# Patient Record
Sex: FEMALE | Race: WHITE | Employment: FULL TIME | URBAN - METROPOLITAN AREA
[De-identification: names, ages, dates, MRNs, and addresses within clinical notes are randomized per-mention and may not be internally consistent; named-entity substitution may affect disease eponyms.]

---

## 2017-03-20 ENCOUNTER — HOSPITAL LABORATORY (OUTPATIENT)
Dept: OTHER | Facility: CLINIC | Age: 72
End: 2017-03-20

## 2017-03-20 LAB — HGB BLD-MCNC: 13.1 G/DL (ref 11.7–15.7)

## 2017-03-21 ENCOUNTER — TRANSFERRED RECORDS (OUTPATIENT)
Dept: HEALTH INFORMATION MANAGEMENT | Facility: CLINIC | Age: 72
End: 2017-03-21

## 2017-04-03 ENCOUNTER — APPOINTMENT (OUTPATIENT)
Dept: PHYSICAL THERAPY | Facility: CLINIC | Age: 72
DRG: 470 | End: 2017-04-03
Attending: ORTHOPAEDIC SURGERY
Payer: COMMERCIAL

## 2017-04-03 ENCOUNTER — APPOINTMENT (OUTPATIENT)
Dept: GENERAL RADIOLOGY | Facility: CLINIC | Age: 72
DRG: 470 | End: 2017-04-03
Attending: ORTHOPAEDIC SURGERY
Payer: COMMERCIAL

## 2017-04-03 ENCOUNTER — ANESTHESIA (OUTPATIENT)
Dept: SURGERY | Facility: CLINIC | Age: 72
DRG: 470 | End: 2017-04-03
Payer: COMMERCIAL

## 2017-04-03 ENCOUNTER — ANESTHESIA EVENT (OUTPATIENT)
Dept: SURGERY | Facility: CLINIC | Age: 72
DRG: 470 | End: 2017-04-03
Payer: COMMERCIAL

## 2017-04-03 ENCOUNTER — HOSPITAL ENCOUNTER (INPATIENT)
Facility: CLINIC | Age: 72
LOS: 3 days | Discharge: HOME | DRG: 470 | End: 2017-04-06
Attending: ORTHOPAEDIC SURGERY | Admitting: ORTHOPAEDIC SURGERY
Payer: COMMERCIAL

## 2017-04-03 DIAGNOSIS — Z96.652 STATUS POST TOTAL LEFT KNEE REPLACEMENT: Primary | ICD-10-CM

## 2017-04-03 PROBLEM — Z96.659 S/P TOTAL KNEE ARTHROPLASTY: Status: ACTIVE | Noted: 2017-04-03

## 2017-04-03 LAB
ANION GAP SERPL CALCULATED.3IONS-SCNC: 9 MMOL/L (ref 3–14)
BUN SERPL-MCNC: 16 MG/DL (ref 7–30)
CALCIUM SERPL-MCNC: 8.8 MG/DL (ref 8.5–10.1)
CHLORIDE SERPL-SCNC: 102 MMOL/L (ref 94–109)
CO2 SERPL-SCNC: 29 MMOL/L (ref 20–32)
CREAT SERPL-MCNC: 0.76 MG/DL (ref 0.52–1.04)
GFR SERPL CREATININE-BSD FRML MDRD: 75 ML/MIN/1.7M2
GLUCOSE SERPL-MCNC: 107 MG/DL (ref 70–99)
HGB BLD-MCNC: 12.5 G/DL (ref 11.7–15.7)
PLATELET # BLD AUTO: 242 10E9/L (ref 150–450)
POTASSIUM SERPL-SCNC: 3.3 MMOL/L (ref 3.4–5.3)
SODIUM SERPL-SCNC: 140 MMOL/L (ref 133–144)

## 2017-04-03 PROCEDURE — 71000012 ZZH RECOVERY PHASE 1 LEVEL 1 FIRST HR: Performed by: ORTHOPAEDIC SURGERY

## 2017-04-03 PROCEDURE — 25000128 H RX IP 250 OP 636: Performed by: ORTHOPAEDIC SURGERY

## 2017-04-03 PROCEDURE — 25800025 ZZH RX 258: Performed by: ANESTHESIOLOGY

## 2017-04-03 PROCEDURE — 40000193 ZZH STATISTIC PT WARD VISIT: Performed by: PHYSICAL THERAPIST

## 2017-04-03 PROCEDURE — 25000132 ZZH RX MED GY IP 250 OP 250 PS 637: Performed by: ORTHOPAEDIC SURGERY

## 2017-04-03 PROCEDURE — 80048 BASIC METABOLIC PNL TOTAL CA: CPT | Performed by: ORTHOPAEDIC SURGERY

## 2017-04-03 PROCEDURE — 25000125 ZZHC RX 250: Performed by: ORTHOPAEDIC SURGERY

## 2017-04-03 PROCEDURE — 99207 ZZC CONSULT E&M CHANGED TO INITIAL LEVEL: CPT | Performed by: PHYSICIAN ASSISTANT

## 2017-04-03 PROCEDURE — 97530 THERAPEUTIC ACTIVITIES: CPT | Mod: GP | Performed by: PHYSICAL THERAPIST

## 2017-04-03 PROCEDURE — 0SRD0J9 REPLACEMENT OF LEFT KNEE JOINT WITH SYNTHETIC SUBSTITUTE, CEMENTED, OPEN APPROACH: ICD-10-PCS | Performed by: ORTHOPAEDIC SURGERY

## 2017-04-03 PROCEDURE — 97161 PT EVAL LOW COMPLEX 20 MIN: CPT | Mod: GP | Performed by: PHYSICAL THERAPIST

## 2017-04-03 PROCEDURE — 85018 HEMOGLOBIN: CPT | Performed by: ORTHOPAEDIC SURGERY

## 2017-04-03 PROCEDURE — C1776 JOINT DEVICE (IMPLANTABLE): HCPCS | Performed by: ORTHOPAEDIC SURGERY

## 2017-04-03 PROCEDURE — 27110028 ZZH OR GENERAL SUPPLY NON-STERILE: Performed by: ORTHOPAEDIC SURGERY

## 2017-04-03 PROCEDURE — 25000125 ZZHC RX 250: Performed by: ANESTHESIOLOGY

## 2017-04-03 PROCEDURE — 25000125 ZZHC RX 250: Performed by: NURSE ANESTHETIST, CERTIFIED REGISTERED

## 2017-04-03 PROCEDURE — 25000128 H RX IP 250 OP 636: Performed by: NURSE ANESTHETIST, CERTIFIED REGISTERED

## 2017-04-03 PROCEDURE — 27210995 ZZH RX 272: Performed by: ORTHOPAEDIC SURGERY

## 2017-04-03 PROCEDURE — 37000008 ZZH ANESTHESIA TECHNICAL FEE, 1ST 30 MIN: Performed by: ORTHOPAEDIC SURGERY

## 2017-04-03 PROCEDURE — 40000171 ZZH STATISTIC PRE-PROCEDURE ASSESSMENT III: Performed by: ORTHOPAEDIC SURGERY

## 2017-04-03 PROCEDURE — 97110 THERAPEUTIC EXERCISES: CPT | Mod: GP | Performed by: PHYSICAL THERAPIST

## 2017-04-03 PROCEDURE — 36000093 ZZH SURGERY LEVEL 4 1ST 30 MIN: Performed by: ORTHOPAEDIC SURGERY

## 2017-04-03 PROCEDURE — 36415 COLL VENOUS BLD VENIPUNCTURE: CPT | Performed by: ORTHOPAEDIC SURGERY

## 2017-04-03 PROCEDURE — 12000007 ZZH R&B INTERMEDIATE

## 2017-04-03 PROCEDURE — 37000009 ZZH ANESTHESIA TECHNICAL FEE, EACH ADDTL 15 MIN: Performed by: ORTHOPAEDIC SURGERY

## 2017-04-03 PROCEDURE — 85049 AUTOMATED PLATELET COUNT: CPT | Performed by: ORTHOPAEDIC SURGERY

## 2017-04-03 PROCEDURE — 25000128 H RX IP 250 OP 636: Performed by: ANESTHESIOLOGY

## 2017-04-03 PROCEDURE — 40000940 XR KNEE PORT LT 1/2 VW: Mod: LT

## 2017-04-03 PROCEDURE — 99222 1ST HOSP IP/OBS MODERATE 55: CPT | Performed by: PHYSICIAN ASSISTANT

## 2017-04-03 PROCEDURE — 25000132 ZZH RX MED GY IP 250 OP 250 PS 637: Performed by: PHYSICIAN ASSISTANT

## 2017-04-03 PROCEDURE — 27210794 ZZH OR GENERAL SUPPLY STERILE: Performed by: ORTHOPAEDIC SURGERY

## 2017-04-03 PROCEDURE — 25800025 ZZH RX 258: Performed by: ORTHOPAEDIC SURGERY

## 2017-04-03 PROCEDURE — 27810169 ZZH OR IMPLANT GENERAL: Performed by: ORTHOPAEDIC SURGERY

## 2017-04-03 PROCEDURE — 36000063 ZZH SURGERY LEVEL 4 EA 15 ADDTL MIN: Performed by: ORTHOPAEDIC SURGERY

## 2017-04-03 DEVICE — IMP COMP PATELLA SNR GENESIS II 9X32MM 71420576: Type: IMPLANTABLE DEVICE | Site: KNEE | Status: FUNCTIONAL

## 2017-04-03 DEVICE — IMP INSERT ARTICULAR S&N LGN CR XLPE SZ3-4 9MM 71453111: Type: IMPLANTABLE DEVICE | Site: KNEE | Status: FUNCTIONAL

## 2017-04-03 DEVICE — IMP COMP FEMORAL S&N GENESIS II NP CR SZ 4 LT 71423204: Type: IMPLANTABLE DEVICE | Site: KNEE | Status: FUNCTIONAL

## 2017-04-03 DEVICE — BONE CEMENT RADIOPAQUE SIMPLEX HV FULL DOSE 6194-1-001: Type: IMPLANTABLE DEVICE | Site: KNEE | Status: FUNCTIONAL

## 2017-04-03 DEVICE — IMP BASEPLATE TIBIAL GENESIS II SZ 3 LT TI 71420164: Type: IMPLANTABLE DEVICE | Site: KNEE | Status: FUNCTIONAL

## 2017-04-03 RX ORDER — NALOXONE HYDROCHLORIDE 0.4 MG/ML
.1-.4 INJECTION, SOLUTION INTRAMUSCULAR; INTRAVENOUS; SUBCUTANEOUS
Status: DISCONTINUED | OUTPATIENT
Start: 2017-04-03 | End: 2017-04-06 | Stop reason: HOSPADM

## 2017-04-03 RX ORDER — PROCHLORPERAZINE MALEATE 5 MG
5 TABLET ORAL EVERY 6 HOURS PRN
Status: DISCONTINUED | OUTPATIENT
Start: 2017-04-03 | End: 2017-04-06 | Stop reason: HOSPADM

## 2017-04-03 RX ORDER — ACETAMINOPHEN 325 MG/1
650 TABLET ORAL EVERY 4 HOURS PRN
Status: DISCONTINUED | OUTPATIENT
Start: 2017-04-06 | End: 2017-04-06 | Stop reason: HOSPADM

## 2017-04-03 RX ORDER — OXYCODONE HYDROCHLORIDE 5 MG/1
5-10 TABLET ORAL EVERY 4 HOURS PRN
Status: DISCONTINUED | OUTPATIENT
Start: 2017-04-03 | End: 2017-04-03

## 2017-04-03 RX ORDER — BUPIVACAINE HYDROCHLORIDE 7.5 MG/ML
INJECTION, SOLUTION INTRASPINAL PRN
Status: DISCONTINUED | OUTPATIENT
Start: 2017-04-03 | End: 2017-04-03

## 2017-04-03 RX ORDER — EPHEDRINE SULFATE 50 MG/ML
INJECTION, SOLUTION INTRAMUSCULAR; INTRAVENOUS; SUBCUTANEOUS PRN
Status: DISCONTINUED | OUTPATIENT
Start: 2017-04-03 | End: 2017-04-03

## 2017-04-03 RX ORDER — AMOXICILLIN 250 MG
1-2 CAPSULE ORAL 2 TIMES DAILY
Status: DISCONTINUED | OUTPATIENT
Start: 2017-04-03 | End: 2017-04-06 | Stop reason: HOSPADM

## 2017-04-03 RX ORDER — ALBUTEROL SULFATE 0.83 MG/ML
2.5 SOLUTION RESPIRATORY (INHALATION) EVERY 4 HOURS PRN
Status: DISCONTINUED | OUTPATIENT
Start: 2017-04-03 | End: 2017-04-03 | Stop reason: HOSPADM

## 2017-04-03 RX ORDER — ONDANSETRON 2 MG/ML
4 INJECTION INTRAMUSCULAR; INTRAVENOUS EVERY 6 HOURS PRN
Status: DISCONTINUED | OUTPATIENT
Start: 2017-04-03 | End: 2017-04-06 | Stop reason: HOSPADM

## 2017-04-03 RX ORDER — FENTANYL CITRATE 50 UG/ML
25-50 INJECTION, SOLUTION INTRAMUSCULAR; INTRAVENOUS
Status: COMPLETED | OUTPATIENT
Start: 2017-04-03 | End: 2017-04-03

## 2017-04-03 RX ORDER — ACETAMINOPHEN 325 MG/1
975 TABLET ORAL ONCE
Status: DISCONTINUED | OUTPATIENT
Start: 2017-04-03 | End: 2017-04-03

## 2017-04-03 RX ORDER — ONDANSETRON 2 MG/ML
4 INJECTION INTRAMUSCULAR; INTRAVENOUS EVERY 30 MIN PRN
Status: DISCONTINUED | OUTPATIENT
Start: 2017-04-03 | End: 2017-04-03 | Stop reason: HOSPADM

## 2017-04-03 RX ORDER — CEFAZOLIN SODIUM 2 G/100ML
2 INJECTION, SOLUTION INTRAVENOUS
Status: COMPLETED | OUTPATIENT
Start: 2017-04-03 | End: 2017-04-03

## 2017-04-03 RX ORDER — PROPOFOL 10 MG/ML
INJECTION, EMULSION INTRAVENOUS PRN
Status: DISCONTINUED | OUTPATIENT
Start: 2017-04-03 | End: 2017-04-03

## 2017-04-03 RX ORDER — PROPOFOL 10 MG/ML
INJECTION, EMULSION INTRAVENOUS CONTINUOUS PRN
Status: DISCONTINUED | OUTPATIENT
Start: 2017-04-03 | End: 2017-04-03

## 2017-04-03 RX ORDER — POTASSIUM CHLORIDE 1500 MG/1
20-40 TABLET, EXTENDED RELEASE ORAL
Status: DISCONTINUED | OUTPATIENT
Start: 2017-04-03 | End: 2017-04-06 | Stop reason: HOSPADM

## 2017-04-03 RX ORDER — CYCLOBENZAPRINE HCL 5 MG
5 TABLET ORAL 3 TIMES DAILY PRN
Status: DISCONTINUED | OUTPATIENT
Start: 2017-04-03 | End: 2017-04-06 | Stop reason: HOSPADM

## 2017-04-03 RX ORDER — POTASSIUM CHLORIDE 29.8 MG/ML
20 INJECTION INTRAVENOUS
Status: DISCONTINUED | OUTPATIENT
Start: 2017-04-03 | End: 2017-04-06 | Stop reason: HOSPADM

## 2017-04-03 RX ORDER — MAGNESIUM HYDROXIDE 1200 MG/15ML
LIQUID ORAL PRN
Status: DISCONTINUED | OUTPATIENT
Start: 2017-04-03 | End: 2017-04-03 | Stop reason: HOSPADM

## 2017-04-03 RX ORDER — THYROID 30 MG/1
90 TABLET ORAL EVERY MORNING
Status: DISCONTINUED | OUTPATIENT
Start: 2017-04-04 | End: 2017-04-06 | Stop reason: HOSPADM

## 2017-04-03 RX ORDER — HYDRALAZINE HYDROCHLORIDE 20 MG/ML
2.5-5 INJECTION INTRAMUSCULAR; INTRAVENOUS EVERY 10 MIN PRN
Status: DISCONTINUED | OUTPATIENT
Start: 2017-04-03 | End: 2017-04-03 | Stop reason: HOSPADM

## 2017-04-03 RX ORDER — CEFAZOLIN SODIUM 1 G/3ML
1 INJECTION, POWDER, FOR SOLUTION INTRAMUSCULAR; INTRAVENOUS EVERY 8 HOURS
Status: COMPLETED | OUTPATIENT
Start: 2017-04-03 | End: 2017-04-04

## 2017-04-03 RX ORDER — ONDANSETRON 4 MG/1
4 TABLET, ORALLY DISINTEGRATING ORAL EVERY 6 HOURS PRN
Status: DISCONTINUED | OUTPATIENT
Start: 2017-04-03 | End: 2017-04-06 | Stop reason: HOSPADM

## 2017-04-03 RX ORDER — ONDANSETRON 2 MG/ML
INJECTION INTRAMUSCULAR; INTRAVENOUS PRN
Status: DISCONTINUED | OUTPATIENT
Start: 2017-04-03 | End: 2017-04-03

## 2017-04-03 RX ORDER — SODIUM CHLORIDE, SODIUM LACTATE, POTASSIUM CHLORIDE, CALCIUM CHLORIDE 600; 310; 30; 20 MG/100ML; MG/100ML; MG/100ML; MG/100ML
INJECTION, SOLUTION INTRAVENOUS CONTINUOUS
Status: DISCONTINUED | OUTPATIENT
Start: 2017-04-03 | End: 2017-04-03

## 2017-04-03 RX ORDER — ACETAMINOPHEN 325 MG/1
975 TABLET ORAL EVERY 8 HOURS
Status: DISCONTINUED | OUTPATIENT
Start: 2017-04-03 | End: 2017-04-06 | Stop reason: HOSPADM

## 2017-04-03 RX ORDER — POTASSIUM CHLORIDE 7.45 MG/ML
10 INJECTION INTRAVENOUS
Status: DISCONTINUED | OUTPATIENT
Start: 2017-04-03 | End: 2017-04-06 | Stop reason: HOSPADM

## 2017-04-03 RX ORDER — POTASSIUM CHLORIDE 1.5 G/1.58G
20-40 POWDER, FOR SOLUTION ORAL
Status: DISCONTINUED | OUTPATIENT
Start: 2017-04-03 | End: 2017-04-06 | Stop reason: HOSPADM

## 2017-04-03 RX ORDER — DEXTROSE MONOHYDRATE, SODIUM CHLORIDE, AND POTASSIUM CHLORIDE 50; 1.49; 4.5 G/1000ML; G/1000ML; G/1000ML
INJECTION, SOLUTION INTRAVENOUS CONTINUOUS
Status: DISCONTINUED | OUTPATIENT
Start: 2017-04-03 | End: 2017-04-03

## 2017-04-03 RX ORDER — SODIUM CHLORIDE, SODIUM LACTATE, POTASSIUM CHLORIDE, CALCIUM CHLORIDE 600; 310; 30; 20 MG/100ML; MG/100ML; MG/100ML; MG/100ML
INJECTION, SOLUTION INTRAVENOUS CONTINUOUS
Status: DISCONTINUED | OUTPATIENT
Start: 2017-04-03 | End: 2017-04-03 | Stop reason: HOSPADM

## 2017-04-03 RX ORDER — DEXAMETHASONE SODIUM PHOSPHATE 4 MG/ML
INJECTION, SOLUTION INTRA-ARTICULAR; INTRALESIONAL; INTRAMUSCULAR; INTRAVENOUS; SOFT TISSUE PRN
Status: DISCONTINUED | OUTPATIENT
Start: 2017-04-03 | End: 2017-04-03

## 2017-04-03 RX ORDER — OXYCODONE HYDROCHLORIDE 5 MG/1
5-10 TABLET ORAL
Status: DISCONTINUED | OUTPATIENT
Start: 2017-04-03 | End: 2017-04-06 | Stop reason: HOSPADM

## 2017-04-03 RX ORDER — FENTANYL CITRATE 50 UG/ML
INJECTION, SOLUTION INTRAMUSCULAR; INTRAVENOUS PRN
Status: DISCONTINUED | OUTPATIENT
Start: 2017-04-03 | End: 2017-04-03

## 2017-04-03 RX ORDER — HYDROMORPHONE HYDROCHLORIDE 1 MG/ML
.3-.5 INJECTION, SOLUTION INTRAMUSCULAR; INTRAVENOUS; SUBCUTANEOUS
Status: DISCONTINUED | OUTPATIENT
Start: 2017-04-03 | End: 2017-04-06 | Stop reason: HOSPADM

## 2017-04-03 RX ORDER — HYDROMORPHONE HYDROCHLORIDE 1 MG/ML
.3-.5 INJECTION, SOLUTION INTRAMUSCULAR; INTRAVENOUS; SUBCUTANEOUS EVERY 5 MIN PRN
Status: DISCONTINUED | OUTPATIENT
Start: 2017-04-03 | End: 2017-04-03 | Stop reason: HOSPADM

## 2017-04-03 RX ORDER — FENTANYL CITRATE 50 UG/ML
25-50 INJECTION, SOLUTION INTRAMUSCULAR; INTRAVENOUS
Status: DISCONTINUED | OUTPATIENT
Start: 2017-04-03 | End: 2017-04-03 | Stop reason: HOSPADM

## 2017-04-03 RX ORDER — LIDOCAINE 40 MG/G
CREAM TOPICAL
Status: DISCONTINUED | OUTPATIENT
Start: 2017-04-03 | End: 2017-04-06 | Stop reason: HOSPADM

## 2017-04-03 RX ORDER — CEFAZOLIN SODIUM 1 G/3ML
1 INJECTION, POWDER, FOR SOLUTION INTRAMUSCULAR; INTRAVENOUS SEE ADMIN INSTRUCTIONS
Status: DISCONTINUED | OUTPATIENT
Start: 2017-04-03 | End: 2017-04-03

## 2017-04-03 RX ORDER — ONDANSETRON 4 MG/1
4 TABLET, ORALLY DISINTEGRATING ORAL EVERY 30 MIN PRN
Status: DISCONTINUED | OUTPATIENT
Start: 2017-04-03 | End: 2017-04-03 | Stop reason: HOSPADM

## 2017-04-03 RX ADMIN — SODIUM CHLORIDE, POTASSIUM CHLORIDE, SODIUM LACTATE AND CALCIUM CHLORIDE: 600; 310; 30; 20 INJECTION, SOLUTION INTRAVENOUS at 10:15

## 2017-04-03 RX ADMIN — POTASSIUM CHLORIDE, DEXTROSE MONOHYDRATE AND SODIUM CHLORIDE: 150; 5; 450 INJECTION, SOLUTION INTRAVENOUS at 13:14

## 2017-04-03 RX ADMIN — ROPIVACAINE HYDROCHLORIDE 20 ML GIVEN: 5 INJECTION, SOLUTION EPIDURAL; INFILTRATION; PERINEURAL at 09:26

## 2017-04-03 RX ADMIN — FENTANYL CITRATE 50 MCG: 50 INJECTION, SOLUTION INTRAMUSCULAR; INTRAVENOUS at 11:08

## 2017-04-03 RX ADMIN — ONDANSETRON 4 MG: 2 INJECTION INTRAMUSCULAR; INTRAVENOUS at 10:56

## 2017-04-03 RX ADMIN — Medication 10 MG: at 11:15

## 2017-04-03 RX ADMIN — MIDAZOLAM HYDROCHLORIDE 1 MG: 1 INJECTION, SOLUTION INTRAMUSCULAR; INTRAVENOUS at 09:30

## 2017-04-03 RX ADMIN — ACETAMINOPHEN 975 MG: 325 TABLET, FILM COATED ORAL at 21:17

## 2017-04-03 RX ADMIN — PHENYLEPHRINE HYDROCHLORIDE 100 MCG: 10 INJECTION, SOLUTION INTRAMUSCULAR; INTRAVENOUS; SUBCUTANEOUS at 11:15

## 2017-04-03 RX ADMIN — CEFAZOLIN SODIUM 2 G: 2 INJECTION, SOLUTION INTRAVENOUS at 10:10

## 2017-04-03 RX ADMIN — SODIUM CHLORIDE, POTASSIUM CHLORIDE, SODIUM LACTATE AND CALCIUM CHLORIDE: 600; 310; 30; 20 INJECTION, SOLUTION INTRAVENOUS at 09:04

## 2017-04-03 RX ADMIN — FENTANYL CITRATE 50 MCG: 50 INJECTION, SOLUTION INTRAMUSCULAR; INTRAVENOUS at 09:29

## 2017-04-03 RX ADMIN — BUPIVACAINE HYDROCHLORIDE IN DEXTROSE 12 MG: 7.5 INJECTION, SOLUTION SUBARACHNOID at 10:05

## 2017-04-03 RX ADMIN — CEFAZOLIN SODIUM 1 G: 1 INJECTION, POWDER, FOR SOLUTION INTRAMUSCULAR; INTRAVENOUS at 17:43

## 2017-04-03 RX ADMIN — POTASSIUM CHLORIDE 20 MEQ: 1500 TABLET, EXTENDED RELEASE ORAL at 21:17

## 2017-04-03 RX ADMIN — OXYCODONE HYDROCHLORIDE 5 MG: 5 TABLET ORAL at 18:44

## 2017-04-03 RX ADMIN — DEXAMETHASONE SODIUM PHOSPHATE 4 MG: 4 INJECTION, SOLUTION INTRAMUSCULAR; INTRAVENOUS at 10:54

## 2017-04-03 RX ADMIN — POTASSIUM CHLORIDE 40 MEQ: 1500 TABLET, EXTENDED RELEASE ORAL at 18:44

## 2017-04-03 RX ADMIN — SODIUM CHLORIDE, POTASSIUM CHLORIDE, SODIUM LACTATE AND CALCIUM CHLORIDE: 600; 310; 30; 20 INJECTION, SOLUTION INTRAVENOUS at 11:25

## 2017-04-03 RX ADMIN — SENNOSIDES AND DOCUSATE SODIUM 1 TABLET: 8.6; 5 TABLET ORAL at 21:17

## 2017-04-03 RX ADMIN — TRANEXAMIC ACID 1 G: 100 INJECTION, SOLUTION INTRAVENOUS at 10:20

## 2017-04-03 RX ADMIN — HYDROMORPHONE HYDROCHLORIDE 0.5 MG: 1 INJECTION, SOLUTION INTRAMUSCULAR; INTRAVENOUS; SUBCUTANEOUS at 12:26

## 2017-04-03 RX ADMIN — OXYCODONE HYDROCHLORIDE 5 MG: 5 TABLET ORAL at 21:40

## 2017-04-03 RX ADMIN — PROPOFOL 75 MCG/KG/MIN: 10 INJECTION, EMULSION INTRAVENOUS at 10:10

## 2017-04-03 RX ADMIN — FENTANYL CITRATE 50 MCG: 50 INJECTION, SOLUTION INTRAMUSCULAR; INTRAVENOUS at 10:03

## 2017-04-03 RX ADMIN — OXYCODONE HYDROCHLORIDE 5 MG: 5 TABLET ORAL at 15:35

## 2017-04-03 ASSESSMENT — COPD QUESTIONNAIRES: COPD: 0

## 2017-04-03 ASSESSMENT — LIFESTYLE VARIABLES: TOBACCO_USE: 0

## 2017-04-03 ASSESSMENT — ENCOUNTER SYMPTOMS
SEIZURES: 0
DYSRHYTHMIAS: 0

## 2017-04-03 NOTE — OP NOTE
PATIENT NAME:  Ginger Mcgill  MEDICAL RECORD #: [id number]  PATIENT BIRTHDAY:  1945  DATE OF SURGERY: 4/3/2017    SURGEON:    Noah Nolan MD    1st ASSISTANT:  KALYANI Hair OPA-C    PREOPERATIVE DIAGNOSIS:  Degenerative osteoarthritis left knee.    POSTOPERATIVE DIAGNOSIS:  Degenerative osteoarthritis left knee.    PROCEDURE: left total knee arthroplasty.    COMPONENTS: Smith & Nephew - Size 4 CR femoral component, size 3 fully stemmed tibial baseplate with 9 mm CR XLPE high flexion articular insert, and 32 mm patellar component.    ANESTHESIA: Spinal     INDICATIONS FOR PROCEDURE:  The patient was brought to the operating room for elective total knee replacement for advanced degenerative osteoarthritis.  The patient received IV antibiotics preoperatively.  These will be continued for 24 hours.  The patient also will receive anticoagulants  for postoperative thrombosis prophylaxis.  The patient understands the indications, alternatives, risks, benefits, and time involved for recovery and wishes to proceed.  The patient is consented for the procedure.      DESCRIPTION OF PROCEDURE:  The patient was brought to the operating room  and following suitable Spinal anesthesia, the left knee was prepped and draped in the usual manner.  Full timeout was carried out and the patient and proper extremity and operative site identified and confirmed by all members of the operative team.    We next exsanguinated the operative leg with a Mega bandage and the tourniquet was elevated to 350 mmHg on the thigh.    A 10 cm longitudinal incision was made anteriorly for the MIS technique.  Sharp dissection was carried down through the subcutaneous tissue.  A median parapatellar arthrotomy was carried out and the knee flexed to 90 degrees.    There was extreme wear in the medial compartment and severe wear in the patellofemoral  compartment and moderate wear in the lateral compartment.    The Smith & Nephew  instrumentation was used.  The femur was cut for a size 4 CR  implant.  The tibia was cut for a size 3 fully stemmed base plate.  The patella was cut for a 32 mm patellar button.    The trial components were removed from the knee.  The bone surfaces were prepared with jet lavage and careful drying technique.     The posterior capsule was injected for postoperative relief with 30 cc of saline, 30 cc of 0.2% Ropivacaine, and 15 mg of Toradol.       We then cemented the components with HD Simplex cement beginning with the tibial baseplate, followed by the femoral component, followed by the patellar component.    The knee was held in extension with the trial insert in place in the tibia until the cement was set.  Once the cement was set up, the trial component was removed.  We selected the 9 mm CR XLPE high flexion articular insert.  This insert was secured and the knee checked one final time for motion, stability, alignment and soft tissue balance, all of which were excellent.    The wound was irrigated throughout with antibiotic solution using jet lavage.  The tourniquet was deflated prior to wound closure and all bleeding controlled with electrocautery.  We then re-exsanguinated the leg and reinflated the tourniquet during wound closure.    The wound was closed over a medium Hemovac drain with spaced 0 Ethibond interrupted and V-Loc running suture in the parapatellar arthrotomy, 2-0 undyed Vicryl in subcutaneous tissue and skin staples in the skin.  A sterile soft dressing was applied.  The tourniquet deflated one final time.  The patient was taken to the PAR in satisfactory condition.  There were no known complications during the procedure.    A surgical assistant was medically necessary for this procedure for pre-op positioning as well as intra-op retraction and extremity support and positioning.  He was present for the entire procedure.      MAGY ALMARAZ MD    CC  Magy Almaraz MD          [phone]  Fax

## 2017-04-03 NOTE — ANESTHESIA PREPROCEDURE EVALUATION
Procedure: Procedure(s):  ARTHROPLASTY KNEE  Preop diagnosis: djd    Allergies   Allergen Reactions     Erythromycin      Sulfa Drugs      Past Medical History:   Diagnosis Date     Arthritis     Osteoarthritis     Hashimoto's thyroiditis      Hyperlipemia      Hypertension      Hypothyroid      Past Surgical History:   Procedure Laterality Date     LAPAROSCOPIC APPENDECTOMY N/A 5/16/2015    Procedure: LAPAROSCOPIC APPENDECTOMY;  Surgeon: Malik Parnell MD;  Location: [facility] OR     THYROID SURGERY       Prior to Admission medications    Medication Sig Start Date End Date Taking? Authorizing Provider   Potassium Chloride Renetta CR (K-DUR PO) Take 10 mEq by mouth daily with food   Yes Reported, Patient   ARMOUR THYROID PO Take 90 mg by mouth daily   Yes Reported, Patient   CHLORTHALIDONE PO Take 12.5 mg by mouth daily (Takes 0.5 x 25mg tablet = 12.5mg dose)   Yes Reported, Patient   Naproxen Sodium (ALEVE PO) Take 220-440 mg by mouth daily as needed for moderate pain   Yes Reported, Patient   UBIQUINOL PO Take 100 mg by mouth daily   Yes Reported, Patient   Nutritional Supplements (NUTRITIONAL SUPPLEMENT PO) Take 3 tablets by mouth 2 times daily L.E.F. Multivitamin   Yes Reported, Patient   Omega-3 Fatty Acids (FISH OIL PO) Take 3 tablets by mouth every morning   Yes Reported, Patient   Cholecalciferol (VITAMIN D PO) Take 2,000 Units by mouth daily Liquid Formulation (takes 2 drops x 1000unit/drop = 2000 units)   Yes Reported, Patient   CALCIUM-MAGNESIUM PO Take 3 tablets by mouth every evening Standard Process Calcium Lactate   Yes Reported, Patient   Probiotic Product (PROBIOTIC PO) Take 1 tablet by mouth every evening L.E. FlorAssist Balance   Yes Reported, Patient   NONFORMULARY Apply topically daily CM Response Joint Cream - knees   Yes Reported, Patient     No current Epic-ordered facility-administered medications on file.      Current Outpatient Prescriptions Ordered in Epic   Medication     Potassium  Chloride Renetta CR (K-DUR PO)     ARMOUR THYROID PO     CHLORTHALIDONE PO     Naproxen Sodium (ALEVE PO)     UBIQUINOL PO     Nutritional Supplements (NUTRITIONAL SUPPLEMENT PO)     Omega-3 Fatty Acids (FISH OIL PO)     Cholecalciferol (VITAMIN D PO)     CALCIUM-MAGNESIUM PO     Probiotic Product (PROBIOTIC PO)     NONFORMULARY     Wt Readings from Last 1 Encounters:   05/16/15 75.8 kg (167 lb)     Temp Readings from Last 1 Encounters:   05/16/15 37.1  C (98.7  F) (Oral)     BP Readings from Last 6 Encounters:   05/16/15 113/49   12/08/11 129/60   10/12/06 120/70     Pulse Readings from Last 4 Encounters:   05/16/15 65   12/08/11 79     Resp Readings from Last 1 Encounters:   05/16/15 18     SpO2 Readings from Last 1 Encounters:   05/16/15 97%     Recent Labs   Lab Test  05/16/15   1000  12/08/11   0825   NA  137  141   POTASSIUM  3.5  4.2   CHLORIDE  98  103   CO2  31  27   ANIONGAP  8  11   GLC  111*  111*   BUN  12  15   CR  0.72  0.77   YUMI  8.8  9.3     Recent Labs   Lab Test  03/20/17   1130  05/16/15   1000  12/08/11   0825   WBC   --   15.3*  8.4   HGB  13.1  13.3  13.7   PLT   --   260  264       ECG: NSR, no st or twave abnormalities.         Anesthesia Evaluation     . Pt has had prior anesthetic. Type: General    No history of anesthetic complications          ROS/MED HX    ENT/Pulmonary:      (-) tobacco use, asthma, COPD and sleep apnea   Neurologic:      (-) seizures, CVA and migraines   Cardiovascular:     (+) hypertension----. : . . . :. .      (-) CAD, [place], arrhythmias, valvular problems/murmurs and dyslipidemia   METS/Exercise Tolerance:  >4 METS   Hematologic:        (-) history of blood clots, anemia and other hematologic disorder   Musculoskeletal:        (-) arthritis   GI/Hepatic:        (-) GERD and liver disease   Renal/Genitourinary:      (-) renal disease   Endo:     (+) thyroid problem .   (-) Type I DM and Type II DM   Psychiatric:         Infectious Disease:        (-) Recent Fever    Malignancy:         Other:                     Physical Exam  Normal systems: cardiovascular, pulmonary and dental    Airway   Mallampati: II  TM distance: >3 FB  Neck ROM: full    Dental   (+) caps    Cardiovascular   Rhythm and rate: regular and normal  (-) no murmur    Pulmonary    breath sounds clear to auscultation                    Anesthesia Plan      History & Physical Review      ASA Status:  2 .    NPO Status:  > 8 hours    Plan for Spinal   PONV prophylaxis:  Ondansetron (or other 5HT-3)       Postoperative Care  Postoperative pain management:  IV analgesics and Oral pain medications.      Consents  Anesthetic plan, risks, benefits and alternatives discussed with:  Patient..                          .

## 2017-04-03 NOTE — ANESTHESIA PROCEDURE NOTES
Peripheral nerve/Neuraxial procedure note : Adductor canal (targeting saphenous nerve)  Pre-Procedure  Performed by UNA CARDOSO  Location: pre-op      Pre-Anesthestic Checklist: patient identified, IV checked, site marked, risks and benefits discussed, informed consent, monitors and equipment checked, pre-op evaluation, at physician/surgeon's request and post-op pain management    Timeout  Correct Patient: Yes   Correct Procedure: Yes   Correct Site: Yes   Correct Laterality: Yes   Correct Position: Yes   Site Marked: Yes   .   Procedure Documentation    .    Procedure:    Adductor canal (targeting saphenous nerve).  Local skin infiltrated with 1 mL of 1% lidocaine.     Ultrasound used to identify targeted nerve, plexus, or vascular marker and placed a needle adjacent to it., Ultrasound was used to visualize the spread of the anesthetic in close proximity to the above stated nerve. A permanent image is entered into the patient's record.  Patient Prep;chlorhexidine gluconate and isopropyl alcohol.  .  Needle: insulated (21 G. 100 mm ). .  Spinal Needle: . . Insertion Method: Single Shot.     Assessment/Narrative  Paresthesias: No.  .  The placement was negative for: blood aspirated, painful injection and site bleeding.  Bolus given via needle..   Secured via.   Complications: none. Comments:  Bolus via needle, 20 ml of 0.5% Ropivacaine with 5 mcg/ml of 1:400,000 epinephrine.  Patient tolerated well, was mildly sedated but communicative throughout the procedure.   The surgeon has given a verbal order transferring care of this patient to me for the performance of regional analgesia block for post op pain control. It is requested of me because I am uniquely trained and qualified to perform this block and the surgeon is neither trained nor qualified to perform this procedure.

## 2017-04-03 NOTE — PLAN OF CARE
Problem: Goal Outcome Summary  Goal: Goal Outcome Summary  PT: Orders received, eval completed, treatment initiated.  Pt POD #0 s/p L TKA.  Pt reports she lives in home with friend (friend is pt's roommate at [facility] who had her hip replaced today, per pt report); states they have a friend, Marissa who plans to stay with them and assist both at discharge.  Pt has x5 steps to enter home, needs can be met on main level, was occasionally using SEC if pain was severe with mobility, otherwise IND with mobility and ADLs at baseline.     Surgeon Discharge Plan: None noted, per pt, home with OPPT (has appt set up for next Friday)     Current Functional Status: CGA supine <> sit with HOB slightly elevated, CGA sit <> Stand with FWW from EOB and toilet, CGA with FWW to amb total x85'.  No KI donned, no knee buckling noted.  ROM: 0-13-60     Barriers to Plan/Home: Stairs (will initiate as soon as able)- most likely will need FWW at discharge, will place DME order

## 2017-04-03 NOTE — ANESTHESIA POSTPROCEDURE EVALUATION
Patient: Ginger Mcgill    Procedure(s):  LEFT TOTAL KNEE ARTHROPLASTY (SMITH & NEPHEW)^  - Wound Class: I-Clean    Diagnosis:djd  Diagnosis Additional Information: No value filed.    Anesthesia Type:  Spinal    Note:  Anesthesia Post Evaluation    Patient location during evaluation: PACU  Patient participation: Able to fully participate in evaluation  Level of consciousness: awake and alert  Pain management: adequate  Airway patency: patent  Cardiovascular status: acceptable  Respiratory status: acceptable  Hydration status: acceptable  PONV: none     Anesthetic complications: None    Comments: Spinal level receeding as expected.         Last vitals:  Vitals:    04/03/17 1315 04/03/17 1345 04/03/17 1415   BP: 108/53 112/65 113/47   Pulse:      Resp: 12 12 12   Temp: 36.3  C (97.3  F)     SpO2: 94% 92% 96%         Electronically Signed By: Lashon Palafox MD  April 3, 2017  2:44 PM

## 2017-04-03 NOTE — IP AVS SNAPSHOT
[address] [facility]    [address] [place] [address]    Phone:  [phone]                                       After Visit Summary   4/3/2017    Ginger Mcgill    MRN: [id number]           After Visit Summary Signature Page     I have received my discharge instructions, and my questions have been answered. I have discussed any challenges I see with this plan with the nurse or doctor.    ..........................................................................................................................................  Patient/Patient Representative Signature      ..........................................................................................................................................  Patient Representative Print Name and Relationship to Patient    ..................................................               ................................................  Date                                            Time    ..........................................................................................................................................  Reviewed by Signature/Title    ...................................................              ..............................................  Date                                                            Time

## 2017-04-03 NOTE — ANESTHESIA CARE TRANSFER NOTE
Patient: Ginger Mcgill    Procedure(s):  LEFT TOTAL KNEE ARTHROPLASTY (SMITH & NEPHEW)^  - Wound Class: I-Clean    Diagnosis: djd  Diagnosis Additional Information: No value filed.    Anesthesia Type:   Spinal     Note:  Airway :Face Mask  Patient transferred to:PACU  Comments: Uneventful transport to PACU   Report to RN  Exchanging well  Pt responds appropriately to command  IV patent  Lips/teeth/dentition as preop status  Questions answered  BP 99/47  HR 67 nsr  TAT 96.9  RR 16  Sat 100%  Dr. LEMUS to stop by to listen to lung field      Vitals: (Last set prior to Anesthesia Care Transfer)    CRNA VITALS  4/3/2017 1120 - 4/3/2017 1158      4/3/2017             Pulse: 75    SpO2: 100 %    Resp Rate (set): 10                Electronically Signed By: LORENZA LORENZ CRNA  April 3, 2017  11:58 AM

## 2017-04-03 NOTE — PLAN OF CARE
Problem: Goal Outcome Summary  Goal: Goal Outcome Summary  Outcome: No Change  Pt is A7Ox4, on clear liquids, IV infusing and VSS on RA. Hemovac is patent, dressing is C/D/I and pulses +2, pt c/o numbness in BLE but is able to wiggle toes. Pt denies pain. Pt voided on the bedpan. Pt had an adductor block, spinal anesthesia and EBL of 10cc. Pt is progressing well per plan of care.

## 2017-04-03 NOTE — ANESTHESIA PROCEDURE NOTES
Peripheral nerve/Neuraxial procedure note : intrathecal  Pre-Procedure  Performed by UNA CARDOSO  Location: OR      Pre-Anesthestic Checklist: patient identified, IV checked, site marked, risks and benefits discussed, informed consent, monitors and equipment checked, pre-op evaluation, at physician/surgeon's request and post-op pain management    Timeout  Correct Patient: Yes   Correct Procedure: Yes   Correct Site: Yes   Correct Laterality: Yes   Correct Position: Yes   Site Marked: Yes   .   Procedure Documentation    .    Procedure:    Intrathecal.  Insertion Site:L2-3  (midline approach)      Patient Prep;povidone-iodine 7.5% surgical scrub.  .  Needle: (). # of attempts: 1. # of redirects:. Spinal Needle: Juanito tip 25 G. 3 in.  Introducer used. Introducer: 20 G. .     Assessment/Narrative  Paresthesias: No.  .  .  clear CSF fluid removed while sitting   . Comments:  Patient sitting on edge of OR bed, lower back cleaned and prepped in sterile fashion with betadine. 1% lido used to numb area. Introducer placed, spinal needle through introducer. Appropriate flow of CSF and confirmed with aspiration via syringe. Spinal dose given, 12 mg 0.75% bupivacaine. No complications.

## 2017-04-03 NOTE — CONSULTS
HOSPITALIST CONSULTATION      PRIMARY CARE PHYSICIAN:   Crys Montes M.D.      REQUESTING PHYSICIAN:   Noah Nolan M.D., Orthopedic Surgery.      REASON FOR CONSULTATION:  Postoperative medical management of issues including hypertension, hypothyroidism.      HISTORY OF PRESENT ILLNESS:  Ms. Ginger Mcgill is a pleasant 71-year-old female with a past medical history of essential hypertension, Hashimoto's thyroiditis, hypothyroidism and hyperlipidemia who underwent a planned left total knee arthroplasty for treatment of osteoarthritis on 04/03/2017.  This procedure was performed by Dr. Noah Nolan under spinal anesthesia and appears to have been without complication.  Estimated blood loss was reported as only 10 cc.  She was given Ancef perioperatively for surgical prophylaxis.      The patient is currently resting comfortably in her hospital bed.  She denies any pain at this time.  She has had no recent illness, cough, cold, fever, flu-like symptoms.  No headache, lightheadedness, chest pain, shortness of breath, abdominal pain, nausea, vomiting, diarrhea, dysuria or focal weakness.  She states her blood pressure is well controlled on chlorthalidone.  She supplements with potassium.  She is on [place] thyroid tablets for her hypothyroidism and her thyroid functions have been stable on this.  She denies any other concerns at this time.      PAST MEDICAL HISTORY:   1.  Hypothyroidism with history of Hashimoto's thyroiditis and partial thyroidectomy.   2.  Essential hypertension.   3.  Hyperlipidemia.   4.  Osteoarthritis.   5.  Laparoscopic appendectomy.      FAMILY HISTORY:  Mother with diabetes, heart disease and hypertension.  Father with heart disease.  She has a sister with diabetes and a brother with heart disease.      SOCIAL HISTORY:  The patient is a lifelong nonsmoker.  She drinks alcohol seldomly.  No illicit drug use.      PRIOR TO ADMISSION MEDICATIONS:   1.  Tylenol 325 mg daily as  needed.   2.  [place] Thyroid 90 mg every morning.   3.  Calcium/magnesium 3 tablets every evening.   4.  Chlorthalidone 12.5 mg daily.   5.  Vitamin D 2000 units every morning.   6.  Magnesium citrate 100 mg every morning.   7.  Naproxen 220-440 mg daily as needed.   8.  Joint cream as needed.   9.  Fish oil 3 capsules every morning.   10.  Potassium chloride 10 mEq daily.   11.  Probiotic 2 capsules at bedtime.   12.  Ubiquinol 100 mg every morning.      ALLERGIES:  Erythromycin and sulfa drugs, both causing hives.      REVIEW OF SYSTEMS:  A complete 10-point review of systems was performed and is negative other than the items previously mentioned above in HPI.      PHYSICAL EXAMINATION:   VITAL SIGNS:  Blood pressure 108/53, heart rate 64 beats per minute, temperature 97.3, respiratory rate 12, oxygen saturation 94% room air.   GENERAL:  The patient is alert, oriented to person, place, date and situation, cooperative, lying in bed in no apparent distress.   HEENT:  Pupils equal and round.  Extraocular movements intact.  Head normocephalic.  Throat, lips, mucosa and tongue appear moist.   NECK:  Supple.   CARDIOVASCULAR:  Heart regular rate and rhythm, no murmur, rub or gallop.  Distal pulses are intact.   PULMONARY:  Lungs clear to auscultation bilaterally, no crackles, wheezes or rhonchi.  Her breathing is nonlabored.   GASTROINTESTINAL:  Abdomen is soft, nontender, nondistended with hypoactive bowel sounds.   MUSCULOSKELETAL:  The patient's left lower extremity is Ace wrapped.  She is able to plantar and dorsiflex both feet equally.  She moves both upper extremities equally.   NEUROLOGIC:  Alert and oriented.  Cranial nerves II-XII are grossly intact.  Motor function and sensation is intact in all 4 extremities.  No focal deficits.   SKIN:  Cool, dry, nondiaphoretic.   PSYCHIATRIC:  Normal mood and affect.      LABORATORY DATA:  BMP:  Potassium 3.3, creatinine 0.76, glucose 107, otherwise within normal limits.   Hemoglobin 12.5 and platelet count 242,000.      ASSESSMENT AND PLAN:  Ms. Ginger Mcgill is a very pleasant 71-year-old female with past medical history of hypertension, hyperlipidemia and hypothyroidism who underwent elective left total knee arthroplasty on 04/03/2017.  The Hospitalist Service was consulted for postoperative medical management.     1.  Osteoarthritis, status post left total knee arthroplasty, postoperative day #0.  The patient is doing well from this standpoint.  Her pain is controlled.  Will defer routine postoperative cares to Orthopedic Surgery.  Will order routine lab work including a BMP and hemoglobin for morning.   2.  Hypothyroidism.  The patient has history of Hashimoto's thyroiditis with goiter and is status post partial thyroidectomy in the 90s.  She is managed on [place] Thyroid 90 mg every morning and her thyroid functions are reportedly stable.  I will continue with her home thyroid medication.   3.  Essential hypertension.  The patient's blood pressure is well controlled at this time.  Will hold prior to admission chlorthalidone.  Can likely resume pending toleration of diet and stable serum creatinine.   4.  Mild hypokalemia.  Potassium 3.3.  Likely related to chronic chlorthalidone use.  She is on 10 mEq of K-Dur daily.  We will resume once her chlorthalidone has resumed.  Otherwise, will replace potassium per protocol.  She also has a potassium in her maintenance IV fluids.   5.  Hyperlipidemia.  Patient's fish oil and Ubiquinol will be held at this time; can be resumed upon discharge.   6.  Deep venous thrombosis prophylaxis; Lovenox has been ordered per Ortho.      This patient was discussed with Dr. Rene Reyes of the [facility] Hospitalist Service.  He is in agreement with my assessment and plan of care.      On behalf of the Hospitalist Service I would like to thank Dr. Nolan for consulting us on this patient.  We will follow up again tomorrow.         RENE FARRELL  MD EDINSON       As dictated by AUSTIN GUARDADO PA-C            D: 2017 13:50   T: 2017 14:34   MT: [id number]      Name:     LEON HOGUE   MRN:      [id number]        Account:       [id number]   :      1945           Consult Date:  2017      Document: [id number]       cc: Noah Reyes MD

## 2017-04-03 NOTE — PROGRESS NOTES
04/03/17 1602   Quick Adds   Type of Visit Initial PT Evaluation   Living Environment   Lives With friend(s)   Living Arrangements house   Home Accessibility stairs (1 railing present);stairs to enter home;stairs within home   Number of Stairs to Enter Home 5  (2+3has grab bars)   Number of Stairs Within Home 14  (laundry in basement but pt's friend Marissa will be assistin)   Transportation Available car   Living Environment Comment Pt lives with her friend Nisreen (Nisreen also had ortho surgery today); pt reports that their friend Marissa will be staying with them to assist as needed upon discharge, Dionicio is a retired RN   Self-Care   Usual Activity Tolerance good   Current Activity Tolerance moderate   Regular Exercise no   Equipment Currently Used at Home cane, straight  (occasional use)   Activity/Exercise/Self-Care Comment Works restoring lamps   Functional Level Prior   Ambulation 1-->assistive equipment  (occasional use of SEC)   Transferring 0-->independent   Toileting 0-->independent   Bathing 0-->independent   Dressing 0-->independent   Eating 0-->independent   Communication 0-->understands/communicates without difficulty   Swallowing 0-->swallows foods/liquids without difficulty   Cognition 0 - no cognition issues reported   Fall history within last six months no   Which of the above functional risks had a recent onset or change? ambulation;transferring   Prior Functional Level Comment IND-mod I (occasional use of SEC) with mobility, IND with ADLs   General Information   Onset of Illness/Injury or Date of Surgery - Date 04/03/17   Referring Physician Noah Nolan MD   Patient/Family Goals Statement states goal is discharge home with OPPT   Pertinent History of Current Problem (include personal factors and/or comorbidities that impact the POC) Pt is a pleasant 71-year-old female with a past medical history of essential hypertension, Hashimoto's thyroiditis, hypothyroidism and hyperlipidemia who  "underwent a planned left total knee arthroplasty for treatment of osteoarthritis on 04/03/2017   Precautions/Limitations fall precautions   Weight-Bearing Status - LLE weight-bearing as tolerated   General Observations KI on at night   General Info Comments Activity: Ambulate with assist    Cognitive Status Examination   Orientation orientation to person, place and time   Level of Consciousness alert   Follows Commands and Answers Questions 100% of the time   Personal Safety and Judgment intact   Memory intact   Pain Assessment   Patient Currently in Pain (2-3/10 in L knee at rest)   Integumentary/Edema   Integumentary/Edema Comments ACE bandage on LLE, hemovac in place   Posture    Posture Forward head position;Protracted shoulders   Range of Motion (ROM)   ROM Comment RLE: hip/ankle WFL, knee flexion slightly limited (reports that this is \"bone on bone\"); LLE: hip/ankle: WFL, knee: 13-60   Strength   Strength Comments WFL in BLE, able to perform SLR IND on LLE   Bed Mobility   Bed Mobility Comments CGA supine <> sit with HOB elevated   Transfer Skills   Transfer Comments CGA sit <> Stand from EOB and toilet with FWW, cues for hand placement   Gait   Gait Comments CGA with FWW x70'   Balance   Balance Comments Good, no overt LOB/lateral path deviation noted with static/dynamic standing activities   Sensory Examination   Sensory Perception Comments denies N/T   General Therapy Interventions   Planned Therapy Interventions balance training;bed mobility training;gait training;neuromuscular re-education;ROM;strengthening;stretching;transfer training;risk factor education;home program guidelines;progressive activity/exercise   Clinical Impression   Criteria for Skilled Therapeutic Intervention yes, treatment indicated   PT Diagnosis decreased functional mobility   Influenced by the following impairments decreased ROM, pain, weakness   Functional limitations due to impairments bed mobility, transfers, ambulation, stair " "climbing   Clinical Presentation Stable/Uncomplicated   Clinical Presentation Rationale stable clinical picture this date   Clinical Decision Making (Complexity) Low complexity   Therapy Frequency` 2 times/day   Predicted Duration of Therapy Intervention (days/wks) 4 days   Anticipated Equipment Needs at Discharge walker   Anticipated Discharge Disposition Home with Assist;Home with Outpatient Therapy   Risk & Benefits of therapy have been explained Yes   Patient, Family & other staff in agreement with plan of care Yes   [facility] TM \"6 Clicks\"   2016, Trustees of [facility], under license to [employer].  All rights reserved.   6 Clicks Short Forms Basic Mobility Inpatient Short Form   [facility] AM-PAC  \"6 Clicks\" V.2 Basic Mobility Inpatient Short Form   1. Turning from your back to your side while in a flat bed without using bedrails? 3 - A Little   2. Moving from lying on your back to sitting on the side of a flat bed without using bedrails? 3 - A Little   3. Moving to and from a bed to a chair (including a wheelchair)? 3 - A Little   4. Standing up from a chair using your arms (e.g., wheelchair, or bedside chair)? 3 - A Little   5. To walk in hospital room? 3 - A Little   6. Climbing 3-5 steps with a railing? 2 - A Lot   Basic Mobility Raw Score (Score out of 24.Lower scores equate to lower levels of function) 17   Total Evaluation Time   Total Evaluation Time (Minutes) 9     "

## 2017-04-03 NOTE — PROGRESS NOTES
Admission medication history interview status for the 4/3/2017  admission is complete. See EPIC admission navigator for prior to admission medications     Medication history source reliability:Good    Medication history interview source(s):Patient    Medication history resources (including written lists, pill bottles, clinic record):Patient phoned in a med list prior to day of surgery.    Primary [facility])    Additional medication history information not noted on PTA med list :None    Time spent in this activity: 30 minutes    Prior to Admission medications    Medication Sig Last Dose Taking? Auth Provider   MAGNESIUM CITRATE PO Take 100 mg by mouth every morning 3/31/2017 at am Yes Reported, Patient   Acetaminophen (TYLENOL PO) Take 325 mg by mouth daily as needed for mild pain or fever 3/31/2017 at prn Yes Reported, Patient   Potassium Chloride Renetta CR (K-DUR PO) Take 10 mEq by mouth daily (with breakfast)  4/2/2017 at 0900 Yes Reported, Patient   ARMOUR THYROID PO Take 90 mg by mouth every morning  4/3/2017 at 0600 Yes Reported, Patient   CHLORTHALIDONE PO Take 12.5 mg by mouth daily (Takes 0.5 x 25mg tablet = 12.5mg dose) 4/2/2017 at 0900 Yes Reported, Patient   Naproxen Sodium (ALEVE PO) Take 220-440 mg by mouth daily as needed for moderate pain Over 1 week ago at pm Yes Reported, Patient   UBIQUINOL PO Take 100 mg by mouth every morning  3/31/2017 at am Yes Reported, Patient   Nutritional Supplements (NUTRITIONAL SUPPLEMENT PO) Take 3 tablets by mouth 2 times daily L.E.F. Multivitamin Over 1 week ago at pm Yes Reported, Patient   Omega-3 Fatty Acids (FISH OIL PO) Take 3 capsules by mouth every morning  Over 1 week ago at am Yes Reported, Patient   Cholecalciferol (VITAMIN D PO) Take 2,000 Units by mouth every morning Liquid Formulation (takes 2 drops x 1000unit/drop = 2000 units)  Over 1 week ago at pm Yes Reported, Patient   CALCIUM-MAGNESIUM PO Take 3 tablets by mouth every evening  Standard Process Calcium Lactate Over 1 week ago at pm Yes Reported, Patient   Probiotic Product (PROBIOTIC PO) Take 2 capsules by mouth At Bedtime LANETTE FlorAssist Balance  4/1/2017 at hs Yes Reported, Patient   NONFORMULARY Apply topically daily CM Response Joint Cream - knees 3/31/2017 at prn Yes Reported, Patient

## 2017-04-03 NOTE — IP AVS SNAPSHOT
MRN:[id number]                      After Visit Summary   4/3/2017    Ginger Mcgill    MRN: [id number]           Thank you!     Thank you for choosing [facility] for your care. Our goal is always to provide you with excellent care. Hearing back from our patients is one way we can continue to improve our services. Please take a few minutes to complete the written survey that you may receive in the mail after you visit with us. Thank you!        Patient Information     Date Of Birth          1945        Designated Caregiver       Most Recent Value    Caregiver    Will someone help with your care after discharge? yes    Name of designated caregiver audrey finnegan    Phone number of caregiver [phone]    Caregiver address [address] [place] [address]      About your hospital stay     You were admitted on:  April 3, 2017 You last received care in the:  [facility] [address] [facility]    You were discharged on:  April 6, 2017        Reason for your hospital stay       Total joint replacement                  Who to Call     For medical emergencies, please call 911.  For non-urgent questions about your medical care, please call your primary care provider or clinic, [phone]  For questions related to your surgery, please call your surgery clinic        Attending Provider     Provider Magy Reyes MD Orthopedics       Primary Care Provider Office Phone # Fax #    Crys Montes [phone]       [address] [address]        Follow-up Appointments     Follow-up and recommended labs and tests        Office visit prearranged                  Further instructions from your care team       DISCHARGE INSTRUCTIONS AFTER YOUR TOTAL KNEE REPLACEMENT     MAGY ALMARAZ MD       Instructions to care for your wound at home:   Change the dressing daily.  Inspect your incision at the time of dressing change for  increased redness, tenderness, swelling, or drainage along the incision line.  Some bruising or discoloration is usually present, but call my office for any changes in appearance that concern you.  Also call if you develop a fever above 101 degrees.     You may shower directly over the wound beginning 4 days after your surgery, but do not submerge the wound under water until after your post operative visit when the sutures are removed and the wound is completely sealed without drainage.    Activities:  Physical activity may be resumed gradually according to your comfort level. You may bear weight on your operative leg as tolerated with your crutches or walker as instructed by your therapist.  Follow your home exercise program.  Ice your knee after exercising.        Wear your knee immobilizer at night only until your office visit in 2 weeks to maintain full knee extension.  Do not use the immobilizer during the day unless otherwise instructed.      Wear the anti-embolism stockings day and night until seen in the office for your post operative visit. Remove them twice daily for one hour at a time. Keep the compression stockings flat on your leg.  Do not allow them to roll up or crease your skin.  Call if you develop calf pain.     Outpatient Physical Therapy and home exercises:  Outpatient physical therapy visits are required following discharge from the hospital. The referral for these outpatient therapy visits is routinely given to you at the time of your surgical scheduling. You should have already scheduled your therapy sessions in advance.  If you have not done so, please immediately call the therapy site of your choice to schedule the physical therapy regimen that has been prescribed for you.  You may discontinue the crutches or walker per the therapist's recommendation.      Medications:  New medications for you on discharge will include a pain medication, a stool softener while on the narcotic pain  medication, and a blood thinner.  Detailed instructions will come with those medications.  You will also receive instructions on when to resume your home medications.     If you routinely take Aspirin 81 mg, hold the Aspirin while taking the formal blood thinner medication. Then take Aspirin 325 mg (1 tablet) daily for 4 weeks.  Then resume your Aspirin 81 mg daily if that is your routine.        Antibiotic coverage will be needed before any type of dental procedure.  This is a life long recommendation.  You should notify your dentist of your total knee surgery and call your dentist or our office one week before a dental appointment for antibiotics.        Clinic follow-up appointment:  Your clinic follow-up appointment has been prearranged.  Call [phone] with any questions.    Noah Nolan MD     Pending Results     No orders found from 4/1/2017 to 4/4/2017.            Statement of Approval     Ordered          04/05/17 1117  I have reviewed and agree with all the recommendations and orders detailed in this document.  EFFECTIVE NOW     Approved and electronically signed by:  Noah Nolan MD             Admission Information     Date & Time Provider Department Dept. Phone    4/3/2017 Noah Nolan MD [facility] [address] [facility] [phone]      Your Vitals Were     Blood Pressure Pulse Temperature Respirations Pulse Oximetry       122/57 (BP Location: Left arm) 86 99.3  F (37.4  C) (Oral) 16 92%       MyChart Information     [employer] gives you secure access to your electronic health record. If you see a primary care provider, you can also send messages to your care team and make appointments. If you have questions, please call your primary care clinic.  If you do not have a primary care provider, please call [phone] and they will assist you.        Care EveryWhere ID     This is your Care EveryWhere ID. This could be used by other organizations to access your  [facility] medical records  [id number]           Review of your medicines      START taking        Dose / Directions    enoxaparin 40 MG/0.4ML injection   Commonly known as:  LOVENOX        Dose:  40 mg   Inject 0.4 mLs (40 mg) Subcutaneous every 24 hours for 10 days   Quantity:  4 mL   Refills:  0       order for DME        Equipment being ordered: Walker Wheels () and Walker () Treatment Diagnosis: impaired gait   Quantity:  1 each   Refills:  0       oxyCODONE 5 MG IR tablet   Commonly known as:  ROXICODONE        Dose:  5-10 mg   Take 1-2 tablets (5-10 mg) by mouth every 3 hours as needed for moderate to severe pain   Quantity:  40 tablet   Refills:  0       senna-docusate 8.6-50 MG per tablet   Commonly known as:  SENOKOT-S;PERICOLACE        Dose:  1-2 tablet   Take 1-2 tablets by mouth 2 times daily   Quantity:  50 tablet   Refills:  0         CONTINUE these medicines which have NOT CHANGED        Dose / Directions    ALEVE PO        Dose:  220-440 mg   Take 220-440 mg by mouth daily as needed for moderate pain   Refills:  0       ARMOUR THYROID PO        Dose:  90 mg   Take 90 mg by mouth every morning   Refills:  0       CALCIUM-MAGNESIUM PO        Dose:  3 tablet   Take 3 tablets by mouth every evening Standard Process Calcium Lactate   Refills:  0       CHLORTHALIDONE PO        Dose:  12.5 mg   Take 12.5 mg by mouth daily (Takes 0.5 x 25mg tablet = 12.5mg dose)   Refills:  0       FISH OIL PO        Dose:  3 capsule   Take 3 capsules by mouth every morning   Refills:  0       K-DUR PO        Dose:  10 mEq   Take 10 mEq by mouth daily (with breakfast)   Refills:  0       MAGNESIUM CITRATE PO        Dose:  100 mg   Take 100 mg by mouth every morning   Refills:  0       NONFORMULARY        Apply topically daily CM Response Joint Cream - knees   Refills:  0       NUTRITIONAL SUPPLEMENT PO        Dose:  3 tablet   Take 3 tablets by mouth 2 times daily L.E.F. Multivitamin   Refills:  0       PROBIOTIC  PO        Dose:  2 capsule   Take 2 capsules by mouth At Bedtime LANETTE Mejiaist Balance   Refills:  0       TYLENOL PO        Dose:  325 mg   Take 325 mg by mouth daily as needed for mild pain or fever   Refills:  0       UBIQUINOL PO        Dose:  100 mg   Take 100 mg by mouth every morning   Refills:  0       VITAMIN D PO        Dose:  2000 Units   Take 2,000 Units by mouth every morning Liquid Formulation (takes 2 drops x 1000unit/drop = 2000 units)   Refills:  0            Where to get your medicines      These medications were sent to [facility] [place], [place] - [address]  [address], [place] [address]     Phone:  [phone]     enoxaparin 40 MG/0.4ML injection    senna-docusate 8.6-50 MG per tablet         Some of these will need a paper prescription and others can be bought over the counter. Ask your nurse if you have questions.     Bring a paper prescription for each of these medications     order for DME    oxyCODONE 5 MG IR tablet                Protect others around you: Learn how to safely use, store and throw away your medicines at www.disposemymeds.org.             Medication List: This is a list of all your medications and when to take them. Check marks below indicate your daily home schedule. Keep this list as a reference.      Medications           Morning Afternoon Evening Bedtime As Needed    ALEVE PO   Take 220-440 mg by mouth daily as needed for moderate pain   Next Dose Due:  Resume                                   ARMOUR THYROID PO   Take 90 mg by mouth every morning   Last time this was given:  90 mg on 4/6/2017  9:34 AM   Next Dose Due:  4/7/17 morning            4/7/17                       CALCIUM-MAGNESIUM PO   Take 3 tablets by mouth every evening Standard Process Calcium Lactate   Next Dose Due:  Resume at discharge.                                CHLORTHALIDONE PO   Take 12.5 mg by mouth daily (Takes 0.5 x 25mg tablet = 12.5mg dose)   Next Dose  Due:  Resume at discharge.                                enoxaparin 40 MG/0.4ML injection   Commonly known as:  LOVENOX   Inject 0.4 mLs (40 mg) Subcutaneous every 24 hours for 10 days   Last time this was given:  40 mg on 4/6/2017  9:35 AM   Next Dose Due:  4/7/17 morning            4/7/17                       FISH OIL PO   Take 3 capsules by mouth every morning   Next Dose Due:  Resume at discharge.                                   K-DUR PO   Take 10 mEq by mouth daily (with breakfast)   Last time this was given:  20 mEq on 4/3/2017  9:17 PM   Next Dose Due:  4/7/17 morning            4/7/17                       MAGNESIUM CITRATE PO   Take 100 mg by mouth every morning   Next Dose Due:  Resume at discharge.                                   NONFORMULARY   Apply topically daily CM Response Joint Cream - knees   Next Dose Due:  Resume at discharge.                                   NUTRITIONAL SUPPLEMENT PO   Take 3 tablets by mouth 2 times daily L.E.F. Multivitamin   Next Dose Due:  Resume at discharge.                                order for DME   Equipment being ordered: Walker Wheels () and Walker () Treatment Diagnosis: impaired gait                                oxyCODONE 5 MG IR tablet   Commonly known as:  ROXICODONE   Take 1-2 tablets (5-10 mg) by mouth every 3 hours as needed for moderate to severe pain   Last time this was given:  10 mg on 4/6/2017  9:35 AM   Next Dose Due:  4/6/2017 4/6/2017  Take anytime       PROBIOTIC PO   Take 2 capsules by mouth At Bedtime L.E. FlorAssist Balance   Next Dose Due:  Resume at discharge.                        4/7/17           senna-docusate 8.6-50 MG per tablet   Commonly known as:  SENOKOT-S;PERICOLACE   Take 1-2 tablets by mouth 2 times daily   Last time this was given:  2 tablets on 4/6/2017  9:35 AM   Next Dose Due:  4/6/17 evening            4/7/17 4/6/17               TYLENOL PO   Take 325 mg by mouth daily as  needed for mild pain or fever   Last time this was given:  975 mg on 4/6/2017  9:34 AM   Next Dose Due:  4/7/2017                                   UBIQUINOL PO   Take 100 mg by mouth every morning   Next Dose Due:  Resume at discharge.                                   VITAMIN D PO   Take 2,000 Units by mouth every morning Liquid Formulation (takes 2 drops x 1000unit/drop = 2000 units)   Next Dose Due:  Resume at discharge.

## 2017-04-04 ENCOUNTER — APPOINTMENT (OUTPATIENT)
Dept: PHYSICAL THERAPY | Facility: CLINIC | Age: 72
DRG: 470 | End: 2017-04-04
Attending: ORTHOPAEDIC SURGERY
Payer: COMMERCIAL

## 2017-04-04 ENCOUNTER — APPOINTMENT (OUTPATIENT)
Dept: ULTRASOUND IMAGING | Facility: CLINIC | Age: 72
DRG: 470 | End: 2017-04-04
Attending: ORTHOPAEDIC SURGERY
Payer: COMMERCIAL

## 2017-04-04 LAB
ANION GAP SERPL CALCULATED.3IONS-SCNC: 8 MMOL/L (ref 3–14)
BUN SERPL-MCNC: 14 MG/DL (ref 7–30)
CALCIUM SERPL-MCNC: 8.1 MG/DL (ref 8.5–10.1)
CHLORIDE SERPL-SCNC: 101 MMOL/L (ref 94–109)
CO2 SERPL-SCNC: 28 MMOL/L (ref 20–32)
CREAT SERPL-MCNC: 0.7 MG/DL (ref 0.52–1.04)
GFR SERPL CREATININE-BSD FRML MDRD: 83 ML/MIN/1.7M2
GLUCOSE SERPL-MCNC: 115 MG/DL (ref 70–99)
HGB BLD-MCNC: 10.6 G/DL (ref 11.7–15.7)
POTASSIUM SERPL-SCNC: 4.1 MMOL/L (ref 3.4–5.3)
POTASSIUM SERPL-SCNC: 4.5 MMOL/L (ref 3.4–5.3)
SODIUM SERPL-SCNC: 137 MMOL/L (ref 133–144)

## 2017-04-04 PROCEDURE — 84132 ASSAY OF SERUM POTASSIUM: CPT | Performed by: ORTHOPAEDIC SURGERY

## 2017-04-04 PROCEDURE — 40000193 ZZH STATISTIC PT WARD VISIT: Performed by: PHYSICAL THERAPIST

## 2017-04-04 PROCEDURE — 85018 HEMOGLOBIN: CPT | Performed by: ORTHOPAEDIC SURGERY

## 2017-04-04 PROCEDURE — 25000125 ZZHC RX 250: Performed by: ORTHOPAEDIC SURGERY

## 2017-04-04 PROCEDURE — 99232 SBSQ HOSP IP/OBS MODERATE 35: CPT | Performed by: INTERNAL MEDICINE

## 2017-04-04 PROCEDURE — 93971 EXTREMITY STUDY: CPT | Mod: LT

## 2017-04-04 PROCEDURE — 12000007 ZZH R&B INTERMEDIATE

## 2017-04-04 PROCEDURE — 36415 COLL VENOUS BLD VENIPUNCTURE: CPT | Performed by: ORTHOPAEDIC SURGERY

## 2017-04-04 PROCEDURE — 25000132 ZZH RX MED GY IP 250 OP 250 PS 637: Performed by: ORTHOPAEDIC SURGERY

## 2017-04-04 PROCEDURE — 97110 THERAPEUTIC EXERCISES: CPT | Mod: GP | Performed by: PHYSICAL THERAPY ASSISTANT

## 2017-04-04 PROCEDURE — 25000128 H RX IP 250 OP 636: Performed by: ORTHOPAEDIC SURGERY

## 2017-04-04 PROCEDURE — 97530 THERAPEUTIC ACTIVITIES: CPT | Mod: GP | Performed by: PHYSICAL THERAPIST

## 2017-04-04 PROCEDURE — 97116 GAIT TRAINING THERAPY: CPT | Mod: GP | Performed by: PHYSICAL THERAPY ASSISTANT

## 2017-04-04 PROCEDURE — 40000193 ZZH STATISTIC PT WARD VISIT: Performed by: PHYSICAL THERAPY ASSISTANT

## 2017-04-04 PROCEDURE — 80048 BASIC METABOLIC PNL TOTAL CA: CPT | Performed by: ORTHOPAEDIC SURGERY

## 2017-04-04 RX ADMIN — ENOXAPARIN SODIUM 40 MG: 40 INJECTION SUBCUTANEOUS at 08:34

## 2017-04-04 RX ADMIN — OXYCODONE HYDROCHLORIDE 10 MG: 5 TABLET ORAL at 20:33

## 2017-04-04 RX ADMIN — SENNOSIDES AND DOCUSATE SODIUM 2 TABLET: 8.6; 5 TABLET ORAL at 08:34

## 2017-04-04 RX ADMIN — OXYCODONE HYDROCHLORIDE 10 MG: 5 TABLET ORAL at 17:24

## 2017-04-04 RX ADMIN — SENNOSIDES AND DOCUSATE SODIUM 2 TABLET: 8.6; 5 TABLET ORAL at 20:33

## 2017-04-04 RX ADMIN — ACETAMINOPHEN 975 MG: 325 TABLET, FILM COATED ORAL at 05:23

## 2017-04-04 RX ADMIN — OXYCODONE HYDROCHLORIDE 10 MG: 5 TABLET ORAL at 13:37

## 2017-04-04 RX ADMIN — ACETAMINOPHEN 975 MG: 325 TABLET, FILM COATED ORAL at 16:09

## 2017-04-04 RX ADMIN — THYROID, PORCINE 90 MG: 30 TABLET ORAL at 16:10

## 2017-04-04 RX ADMIN — ONDANSETRON 4 MG: 4 TABLET, ORALLY DISINTEGRATING ORAL at 16:40

## 2017-04-04 RX ADMIN — CEFAZOLIN SODIUM 1 G: 1 INJECTION, POWDER, FOR SOLUTION INTRAMUSCULAR; INTRAVENOUS at 01:07

## 2017-04-04 RX ADMIN — OXYCODONE HYDROCHLORIDE 5 MG: 5 TABLET ORAL at 01:07

## 2017-04-04 RX ADMIN — OXYCODONE HYDROCHLORIDE 5 MG: 5 TABLET ORAL at 10:32

## 2017-04-04 RX ADMIN — OXYCODONE HYDROCHLORIDE 5 MG: 5 TABLET ORAL at 05:23

## 2017-04-04 NOTE — PLAN OF CARE
Problem: Goal Outcome Summary  Goal: Goal Outcome Summary  Outcome: No Change  Diet : reg, Assist 1+ w to BR. POD 1. Patient  A/O x4. VSS on 1L NC when sleeping. Pain controlled with oxycodone. Dressing CDI. Potassium recheck 4.5,  hgb 10.6. CMS intact. IV antibiotics given. IV: SL. Will continue monitoring.

## 2017-04-04 NOTE — PLAN OF CARE
Problem: Goal Outcome Summary  Goal: Goal Outcome Summary  Outcome: Improving  Up with 1 and walker. Voiding well in bathroom. Dressing dry. Potassium replaced and re-draw set for 0115. IV saline locked. Pain well controlled with oxycodone. Will continue to monitor.

## 2017-04-04 NOTE — PLAN OF CARE
Problem: Goal Outcome Summary  Goal: Goal Outcome Summary  Outcome: No Change  Pt c/o of a good amount of pain in L) calf which caused her to refuse much of her PT. Called MD for an order for doppler of that leg which came back negative. VSS, urinating good amounts after chavez pulled this am. Dressing changed and HMV pulled this am. States outside of calf pain that the pain is well controlled. Feels much better after results of doppler came back negative. Pt was only taking 5mg of oxycodone but then agreed to start taking 10mg at a time.

## 2017-04-04 NOTE — PROGRESS NOTES
[facility] Progress Note  [facility]    Pt participated in visit with her  and hospital roommate.  SH provided prayer and a blessing.     has no plan to f/u at this time.                                                                                                                                           Slime Barnes M.Div.  Chaplain Resident  Pager [phone]

## 2017-04-04 NOTE — PLAN OF CARE
Problem: Goal Outcome Summary  Goal: Goal Outcome Summary  Surgeon Discharge Plan: None noted, per pt, home with OPPT (Appt set up next Friday)     Current Functional Status: At beginning of session, RN present finishing up dressing change, pt agreeable to session but reports increased calf and knee pain this session.  Pt tender to squeeze of calf and reports increase in calf with APs and heel slides (reported calf pain worse than knee pain); session ended, RN notified.     Barriers to Plan/Home: Increase in pain, stairs

## 2017-04-04 NOTE — PROGRESS NOTES
[facility] Orthopedic Post-Op / Progress Note  Ginger Mcgill is a 71 year old female    Today's Date:2017  Admission Date: 4/3/2017  POD # 1 TKA         Interval History:   doing well  Good pain control this AM when seen on rounds            Physical Exam:   All vitals have been reviewed  Temperatures:  Current - Temp: 98.3  F (36.8  C); Max - Temp  Av  F (36.7  C)  Min: 97.3  F (36.3  C)  Max: 98.3  F (36.8  C)  Pulse range: Pulse  Av.5  Min: 60  Max: 61  Blood pressure range: Systolic (24hrs), Av , Min:94 , Max:120   ; Diastolic (24hrs), Av, Min:41, Max:56      Intake/Output Summary (Last 24 hours) at 17 1701  Last data filed at 17 0830   Gross per 24 hour   Intake             1420 ml   Output               80 ml   Net             1340 ml       Wound clean and dry with minimal or no drainage.  Surrounding skin with minimal erythema.          Data:   All laboratory data related to this surgery reviewed      Lab Results   Component Value Date     2017    POTASSIUM 4.1 2017    CHLORIDE 101 2017    CO2 28 2017     (H) 2017     Lab Results   Component Value Date    HGB 10.6 (L) 2017    HGB 12.5 2017    HGB 13.1 2017     Platelet Count (10e9/L)   Date Value   2017 242   2015 260   2011 264       All imaging studies related to this surgery reviewed         Assessment and Plan:    Assessment:  Doing well.  No immediate surgical complications identified.  No excessive bleeding    Plan:  Continue physical therapy  Pain control measures  Discharge plan: Home Thursday    Noah Nolan MD

## 2017-04-04 NOTE — PROGRESS NOTES
[facility]  Hospitalist Progress Note     04/04/2017    Assessment & Plan   Ms. Ginger Mcgill is a very pleasant 71-year-old female with past medical history of hypertension, hyperlipidemia and hypothyroidism who underwent elective left total knee arthroplasty on 04/03/2017.  The Hospitalist Service was consulted for postoperative medical management.     1.  Osteoarthritis, status post left total knee arthroplasty, postoperative day #0.  The patient is doing well from this standpoint.  Her pain is controlled.  Will defer routine postoperative cares to Orthopedic Surgery.      2.  Hypothyroidism.  The patient has history of Hashimoto's thyroiditis with goiter and is status post partial thyroidectomy in the 90s.    - Continue PTA thyroid medication.     3.  Essential hypertension.    - Will hold prior to admission chlorthalidone for now as SBP in 90's-110.    4.  Mild hypokalemia. Likely related to chronic chlorthalidone use.   - replace potassium per protocol  - May resume daily supplement when chlorthalidone resumed    5.  Hyperlipidemia.  Patient's fish oil and Ubiquinol will be held at this time; can be resumed upon discharge.     6.  Acute post-surgical blood loss anemia  Hgb 12.5->10.6  - Transfuse if Hgb < 7    Prophylaxis: Enoxaprain (Lovenox) SQ  Code Status: Full Code  Disposition: Expected discharge per primary team    Rene Reyes MD  [phone] (P)  Text Page (7 am - 6 pm)    Interval History   Pain is controlled.  No events overnight.  Denies shortness of breath.  BP remains low normal.  -Data reviewed today: I reviewed all new labs and imaging results over the last 24 hours.    Physical Exam   /49 (BP Location: Left arm)  Pulse 61  Temp 98  F (36.7  C) (Oral)  Resp 16  SpO2 99%  Constitutional: NAD, awake  HEENT: EOMI   Cardiovascular: S1, S2, regular rhythm  Respiratory: CTAB, no crackles  Gastrointestinal: Soft, NT  Neurologic: No focal deficits    Medications         thyroid  90 mg Oral QAM     sodium chloride (PF)  3 mL Intracatheter Q8H     enoxaparin  40 mg Subcutaneous Q24H     acetaminophen  975 mg Oral Q8H     senna-docusate  1-2 tablet Oral BID       Data     Recent Labs  Lab 04/04/17  0600 04/04/17  0150 04/03/17  0900   HGB 10.6*  --  12.5   PLT  --   --  242     --  140   POTASSIUM 4.1 4.5 3.3*   CHLORIDE 101  --  102   CO2 28  --  29   BUN 14  --  16   CR 0.70  --  0.76   ANIONGAP 8  --  9   YUMI 8.1*  --  8.8   *  --  107*     Recent Results (from the past 24 hour(s))   XR Knee Port Left 1/2 Views    Narrative    XR KNEE PORT LT 1/2 VW 4/3/2017 12:42 PM    HISTORY: Postop.    COMPARISON: None.      Impression    IMPRESSION: Status post knee arthroplasty.  Hardware is intact.  Alignment is anatomic. There is a surgical drain within the knee  joint.    MELANIE TAFOYA MD

## 2017-04-05 ENCOUNTER — APPOINTMENT (OUTPATIENT)
Dept: PHYSICAL THERAPY | Facility: CLINIC | Age: 72
DRG: 470 | End: 2017-04-05
Attending: ORTHOPAEDIC SURGERY
Payer: COMMERCIAL

## 2017-04-05 ENCOUNTER — APPOINTMENT (OUTPATIENT)
Dept: OCCUPATIONAL THERAPY | Facility: CLINIC | Age: 72
DRG: 470 | End: 2017-04-05
Attending: ORTHOPAEDIC SURGERY
Payer: COMMERCIAL

## 2017-04-05 LAB
GLUCOSE SERPL-MCNC: 129 MG/DL (ref 70–99)
HGB BLD-MCNC: 9.7 G/DL (ref 11.7–15.7)

## 2017-04-05 PROCEDURE — 97110 THERAPEUTIC EXERCISES: CPT | Mod: GP | Performed by: PHYSICAL THERAPY ASSISTANT

## 2017-04-05 PROCEDURE — 82947 ASSAY GLUCOSE BLOOD QUANT: CPT | Performed by: ORTHOPAEDIC SURGERY

## 2017-04-05 PROCEDURE — 97165 OT EVAL LOW COMPLEX 30 MIN: CPT | Mod: GO

## 2017-04-05 PROCEDURE — 40000133 ZZH STATISTIC OT WARD VISIT

## 2017-04-05 PROCEDURE — 99232 SBSQ HOSP IP/OBS MODERATE 35: CPT | Performed by: INTERNAL MEDICINE

## 2017-04-05 PROCEDURE — 36415 COLL VENOUS BLD VENIPUNCTURE: CPT | Performed by: ORTHOPAEDIC SURGERY

## 2017-04-05 PROCEDURE — 85018 HEMOGLOBIN: CPT | Performed by: ORTHOPAEDIC SURGERY

## 2017-04-05 PROCEDURE — 40000193 ZZH STATISTIC PT WARD VISIT: Performed by: PHYSICAL THERAPY ASSISTANT

## 2017-04-05 PROCEDURE — 25000128 H RX IP 250 OP 636: Performed by: ORTHOPAEDIC SURGERY

## 2017-04-05 PROCEDURE — 25000132 ZZH RX MED GY IP 250 OP 250 PS 637: Performed by: ORTHOPAEDIC SURGERY

## 2017-04-05 PROCEDURE — 12000007 ZZH R&B INTERMEDIATE

## 2017-04-05 PROCEDURE — 97116 GAIT TRAINING THERAPY: CPT | Mod: GP | Performed by: PHYSICAL THERAPY ASSISTANT

## 2017-04-05 PROCEDURE — 97535 SELF CARE MNGMENT TRAINING: CPT | Mod: GO

## 2017-04-05 RX ORDER — AMOXICILLIN 250 MG
1-2 CAPSULE ORAL 2 TIMES DAILY
Qty: 50 TABLET | Refills: 0 | Status: SHIPPED | OUTPATIENT
Start: 2017-04-05

## 2017-04-05 RX ORDER — OXYCODONE HYDROCHLORIDE 5 MG/1
5-10 TABLET ORAL
Qty: 40 TABLET | Refills: 0 | Status: SHIPPED | OUTPATIENT
Start: 2017-04-05

## 2017-04-05 RX ADMIN — OXYCODONE HYDROCHLORIDE 10 MG: 5 TABLET ORAL at 11:30

## 2017-04-05 RX ADMIN — OXYCODONE HYDROCHLORIDE 10 MG: 5 TABLET ORAL at 04:54

## 2017-04-05 RX ADMIN — SENNOSIDES AND DOCUSATE SODIUM 2 TABLET: 8.6; 5 TABLET ORAL at 07:58

## 2017-04-05 RX ADMIN — SENNOSIDES AND DOCUSATE SODIUM 2 TABLET: 8.6; 5 TABLET ORAL at 20:56

## 2017-04-05 RX ADMIN — ACETAMINOPHEN 975 MG: 325 TABLET, FILM COATED ORAL at 16:25

## 2017-04-05 RX ADMIN — OXYCODONE HYDROCHLORIDE 10 MG: 5 TABLET ORAL at 00:41

## 2017-04-05 RX ADMIN — OXYCODONE HYDROCHLORIDE 5 MG: 5 TABLET ORAL at 22:38

## 2017-04-05 RX ADMIN — ENOXAPARIN SODIUM 40 MG: 40 INJECTION SUBCUTANEOUS at 08:00

## 2017-04-05 RX ADMIN — THYROID, PORCINE 90 MG: 30 TABLET ORAL at 07:58

## 2017-04-05 RX ADMIN — OXYCODONE HYDROCHLORIDE 10 MG: 5 TABLET ORAL at 19:19

## 2017-04-05 RX ADMIN — OXYCODONE HYDROCHLORIDE 5 MG: 5 TABLET ORAL at 07:58

## 2017-04-05 RX ADMIN — ACETAMINOPHEN 975 MG: 325 TABLET, FILM COATED ORAL at 23:48

## 2017-04-05 RX ADMIN — OXYCODONE HYDROCHLORIDE 5 MG: 5 TABLET ORAL at 14:26

## 2017-04-05 RX ADMIN — ACETAMINOPHEN 975 MG: 325 TABLET, FILM COATED ORAL at 00:41

## 2017-04-05 RX ADMIN — ACETAMINOPHEN 975 MG: 325 TABLET, FILM COATED ORAL at 07:58

## 2017-04-05 ASSESSMENT — ACTIVITIES OF DAILY LIVING (ADL): PREVIOUS_RESPONSIBILITIES: MEAL PREP;HOUSEKEEPING;LAUNDRY;MEDICATION MANAGEMENT;DRIVING

## 2017-04-05 NOTE — PROGRESS NOTES
[facility]    Hospitalist Progress Note    Date of Service (when I saw the patient): 04/05/2017    Assessment & Plan   Ginger Mcgill is a 71 year old female who was admitted on 4/3/2017. She has bilateral osteoarthritis of her knees and had a left TKA done    Summary:  Osteoarthritis of the knees  Hashimoto's thyroiditis with [place] thyroid replacement  Hypertension, treated with Chlorthalidone 12.5mg daily and KCl supplementation  Hypokalemia, resolved  Mild blood loss anemia      What I did today:  History and exam  Added a low sodium diet restriction to her diet.  -    -    -      DVT Prophylaxis: Enoxaprain (Lovenox) SQ  Code Status: Prior    Disposition: Expected discharge in 2 days or so, when ready from a surgical standpoint.    Hay Toledo MD    Interval History   Miss Mcgill is a 72 yo woman who has bilateral knee arthritis. Her right knee is worse than her left, but as she has had a patella problem on the left, that was done first.  The pain is less and different. She has no dizziness, dyspnea, light headedness. No chest, abdominal or head pain.   She lives alone, but has help so she will be able to go home after the recovery period.    -Data reviewed today: I reviewed all new labs and imaging results over the last 24 hours. I personally reviewed no images or EKG's today.    Physical Exam   Temp: 98.8  F (37.1  C) Temp src: Oral BP: 134/62 Pulse: 75 Heart Rate: 79 Resp: 16 SpO2: 96 % O2 Device: None (Room air)    There were no vitals filed for this visit.  Vital Signs with Ranges  Temp:  [98.1  F (36.7  C)-100  F (37.8  C)] 98.8  F (37.1  C)  Pulse:  [75-80] 75  Heart Rate:  [70-93] 79  Resp:  [16] 16  BP: (115-134)/(51-62) 134/62  SpO2:  [92 %-96 %] 96 %  I/O last 3 completed shifts:  In: 300 [P.O.:300]  Out: -     Constitutional: Alert, affable  Respiratory: Lungs are clear to A&P  Cardiovascular: Regular rhythm, normal S1 and  S2, no S3 or murmurs, no edema  GI: good bowel  sounds, not tender  Skin/Integumen: no rashes  Other:      Medications        thyroid  90 mg Oral QAM     sodium chloride (PF)  3 mL Intracatheter Q8H     enoxaparin  40 mg Subcutaneous Q24H     acetaminophen  975 mg Oral Q8H     senna-docusate  1-2 tablet Oral BID       Data     Recent Labs  Lab 04/05/17  0638 04/04/17  0600 04/04/17  0150 04/03/17  0900   HGB 9.7* 10.6*  --  12.5   PLT  --   --   --  242   NA  --  137  --  140   POTASSIUM  --  4.1 4.5 3.3*   CHLORIDE  --  101  --  102   CO2  --  28  --  29   BUN  --  14  --  16   CR  --  0.70  --  0.76   ANIONGAP  --  8  --  9   YUMI  --  8.1*  --  8.8   * 115*  --  107*       Imaging:  No results found for this or any previous visit (from the past 24 hour(s)).

## 2017-04-05 NOTE — PROGRESS NOTES
04/05/17 1528   Quick Adds   Type of Visit Initial Occupational Therapy Evaluation   Living Environment   Lives With friend(s)   Living Arrangements house   Home Accessibility stairs (1 railing present);stairs to enter home;stairs within home;grab bars present (toilet);bed and bath on same level;grab bars present (bathtub);tub/shower is not walk in   Number of Stairs to Enter Home 5   Number of Stairs Within Home 14   Transportation Available car   Living Environment Comment Uses a cane occassionally. A friend will stay with to assist.    Self-Care   Usual Activity Tolerance good   Regular Exercise no   Equipment Currently Used at Home cane, straight  (leg , sock aid, reacher, shoe horn)   Functional Level Prior   Ambulation 1-->assistive equipment   Transferring 0-->independent   Toileting 0-->independent   Bathing 0-->independent   Dressing 0-->independent   Eating 0-->independent   Communication 0-->understands/communicates without difficulty   Swallowing 0-->swallows foods/liquids without difficulty   Cognition 0 - no cognition issues reported   Fall history within last six months no   Which of the above functional risks had a recent onset or change? ambulation;transferring;toileting;dressing   Prior Functional Level Comment Independent in all ADLs   General Information   Onset of Illness/Injury or Date of Surgery - Date 04/03/17   Referring Physician Ovidio   Patient/Family Goals Statement Home   Additional Occupational Profile Info/Pertinent History of Current Problem 71-year-old female with a past medical history of essential hypertension, Hashimoto's thyroiditis, hypothyroidism and hyperlipidemia who underwent a planned left total knee arthroplasty for treatment of osteoarthritis on 04/03/2017   Precautions/Limitations fall precautions   Weight-Bearing Status - LLE weight-bearing as tolerated   Cognitive Status Examination   Orientation orientation to person, place and time   Visual Perception  "  Visual Perception Wears glasses   Sensory Examination   Sensory Quick Adds No deficits were identified   Pain Assessment   Patient Currently in Pain Yes, see Vital Sign flowsheet  (4/10)   Transfer Skills   Transfer Transfer Safety Analysis Bed/Chair;Transfer Skill: Stand to Sit;Transfer Safety Analysis Sit/Stand   Transfer Skill: Bed to Chair/Chair to Bed   Level of [place]: Bed to Chair contact guard   Transfer Skill: Sit to Stand   Level of [place]: Sit/Stand contact guard   Toilet Transfer   Toilet Transfer Toilet Transfer Skill;Toilet Transfer Safety Analysis   Transfer Skill: Toilet Transfer   Level of [place]: Toilet contact guard   Lower Body Dressing   Level of [place]: Dress Lower Body minimum assist (75% patients effort)   Toileting   Level of [place]: Toilet contact guard   Instrumental Activities of Daily Living (IADL)   Previous Responsibilities meal prep;housekeeping;laundry;medication management;driving   General Therapy Interventions   Planned Therapy Interventions ADL retraining;IADL retraining;transfer training   Clinical Impression   Criteria for Skilled Therapeutic Interventions Met yes, treatment indicated   OT Diagnosis Decreased ADLs, IADLs, Functional transfer   Influenced by the following impairments pain   Assessment of Occupational Performance 1-3 Performance Deficits   Identified Performance Deficits Decreased ADLs (dressing, bathing, toileting), IADLs, Functional transfer   Clinical Decision Making (Complexity) Low complexity   Therapy Frequency daily   Predicted Duration of Therapy Intervention (days/wks)    Anticipated Discharge Disposition Home with Assist   Risks and Benefits of Treatment have been explained. Yes   Patient, Family & other staff in agreement with plan of care Yes   [facility] AM-PAC TM \"6 Clicks\"   2016, Trustees of [facility], under license to [employer].  All rights reserved.   6 Clicks Short Forms Daily Activity Inpatient " "Short Form   [facility] AM-PAC  \"6 Clicks\" Daily Activity Inpatient Short Form   1. Putting on and taking off regular lower body clothing? 2 - A Lot   2. Bathing (including washing, rinsing, drying)? 2 - A Lot   3. Toileting, which includes using toilet, bedpan or urinal? 2 - A Lot   4. Putting on and taking off regular upper body clothing? 4 - None   5. Taking care of personal grooming such as brushing teeth? 4 - None   6. Eating meals? 4 - None   Daily Activity Raw Score (Score out of 24.Lower scores equate to lower levels of function) 18   Total Evaluation Time   Total Evaluation Time (Minutes) 8     "

## 2017-04-05 NOTE — PROGRESS NOTES
[facility] Orthopedic Post-Op / Progress Note  Ginger Mcgill is a 71 year old female    Today's Date:2017  Admission Date: 4/3/2017  POD # 2 TKA         Interval History:   doing well  Less knee and less leg pain today.  Tricia's negative.  Doing stairs well in PT            Physical Exam:   All vitals have been reviewed  Temperatures:  Current - Temp: 98.7  F (37.1  C); Max - Temp  Av.8  F (37.1  C)  Min: 98.1  F (36.7  C)  Max: 100  F (37.8  C)  Pulse range: Pulse  Av  Min: 60  Max: 80  Blood pressure range: Systolic (24hrs), Av , Min:113 , Max:127   ; Diastolic (24hrs), Av, Min:50, Max:60    No intake or output data in the 24 hours ending 17 1112    Wound clean and dry with minimal or no drainage.  Surrounding skin with minimal erythema.          Data:   All laboratory data related to this surgery reviewed      Lab Results   Component Value Date     2017    POTASSIUM 4.1 2017    CHLORIDE 101 2017    CO2 28 2017     (H) 2017     Lab Results   Component Value Date    HGB 9.7 (L) 2017    HGB 10.6 (L) 2017    HGB 12.5 2017     Platelet Count (10e9/L)   Date Value   2017 242   2015 260   2011 264       All imaging studies related to this surgery reviewed         Assessment and Plan:    Assessment:  Doing well.  No immediate surgical complications identified.  Pain well-controlled.  Tolerating physical therapy and rehabilitation well.    Plan:  Continue physical therapy  Pain control measures  Discharge plan: Home tomorrow    Noah Nolan MD

## 2017-04-05 NOTE — PLAN OF CARE
Problem: Goal Outcome Summary  Goal: Goal Outcome Summary  Outcome: Improving  A/O x 3, L knee incision dressing CDI. CMS intact. Pulses + 1 DP L foot, skin cool. Pain controlled to 4/10 on oxycodone, 10mg q3h and scheduled tylenol. Up to chair and BR with one assist, walker/gait belt. Voiding.

## 2017-04-05 NOTE — PLAN OF CARE
Problem: Goal Outcome Summary  Goal: Goal Outcome Summary  Surgeon Discharge Plan: Home tomorrow.  Planning to have OP PT.  Agree with plan.     Current Functional Status: Pt performed bed mobility with min assist and sit to/from stand transfers with SBA.  Pt performed gait training 10 ft x 1 and 50 ft x 1 using wheeled walker and SBA.  Performed 3 steps x 1 trial using bilateral rails with CGA.  Knee AAROM is 8-76 degrees.     Barriers to Plan/Home: None.

## 2017-04-05 NOTE — DISCHARGE INSTRUCTIONS
DISCHARGE INSTRUCTIONS AFTER YOUR TOTAL KNEE REPLACEMENT     MAGY ALMARAZ MD       Instructions to care for your wound at home:   Change the dressing daily.  Inspect your incision at the time of dressing change for increased redness, tenderness, swelling, or drainage along the incision line.  Some bruising or discoloration is usually present, but call my office for any changes in appearance that concern you.  Also call if you develop a fever above 101 degrees.     You may shower directly over the wound beginning 4 days after your surgery, but do not submerge the wound under water until after your post operative visit when the sutures are removed and the wound is completely sealed without drainage.    Activities:  Physical activity may be resumed gradually according to your comfort level. You may bear weight on your operative leg as tolerated with your crutches or walker as instructed by your therapist.  Follow your home exercise program.  Ice your knee after exercising.        Wear your knee immobilizer at night only until your office visit in 2 weeks to maintain full knee extension.  Do not use the immobilizer during the day unless otherwise instructed.      Wear the anti-embolism stockings day and night until seen in the office for your post operative visit. Remove them twice daily for one hour at a time. Keep the compression stockings flat on your leg.  Do not allow them to roll up or crease your skin.  Call if you develop calf pain.     Outpatient Physical Therapy and home exercises:  Outpatient physical therapy visits are required following discharge from the hospital. The referral for these outpatient therapy visits is routinely given to you at the time of your surgical scheduling. You should have already scheduled your therapy sessions in advance.  If you have not done so, please immediately call the therapy site of your choice to schedule the physical therapy regimen that has been prescribed for you.  You  may discontinue the crutches or walker per the therapist's recommendation.      Medications:  New medications for you on discharge will include a pain medication, a stool softener while on the narcotic pain medication, and a blood thinner.  Detailed instructions will come with those medications.  You will also receive instructions on when to resume your home medications.     If you routinely take Aspirin 81 mg, hold the Aspirin while taking the formal blood thinner medication. Then take Aspirin 325 mg (1 tablet) daily for 4 weeks.  Then resume your Aspirin 81 mg daily if that is your routine.        Antibiotic coverage will be needed before any type of dental procedure.  This is a life long recommendation.  You should notify your dentist of your total knee surgery and call your dentist or our office one week before a dental appointment for antibiotics.        Clinic follow-up appointment:  Your clinic follow-up appointment has been prearranged.  Call [phone] with any questions.    Noah Nolan MD

## 2017-04-05 NOTE — DISCHARGE SUMMARY
DISCHARGE SUMMARY    NAME:  Ginger Mcgill  AGE:  71 year old  YOB: 1945  MRN#:  [id number]    Ginger Mcgill was admitted for elective total knee arthroplasty.  The surgery was performed on 4/3/2017.  The postoperative course is documented in the medical record.  There were no complications. The patient was felt ready for discharge home on POD #3 with post-discharge physical therapy and outpatient visit prearranged.     Lab Results   Component Value Date    HGB 9.7 (L) 04/05/2017    HGB 10.6 (L) 04/04/2017    HGB 12.5 04/03/2017       The patient received 0 units transfusion.      FINAL DISCHARGE DIAGNOSIS:  Degenerative osteoarthritis knee.               Acute blood loss anemia     SURGICAL PROCEDURE THIS ADMISSION:   total knee arthroplasty.         NOAH ALMARAZ MD      CC: Fax [phone]         Noah Almaraz MD

## 2017-04-05 NOTE — PLAN OF CARE
Problem: Goal Outcome Summary  Goal: Goal Outcome Summary  Outcome: Improving  Progressing toward plan of care.

## 2017-04-05 NOTE — PLAN OF CARE
Problem: Goal Outcome Summary  Goal: Goal Outcome Summary  OT evaluation and treatment initiated.      Surgeon Discharge Plan: Home     Current Functional Status:  Pt ambulated to the bathroom with CGA and transferred to the toilet with CGA. While seated, patient completed lower body dressing including pants, sock, and shoes with AE and minimum assist.       Barriers to Plan/Home: fatigue

## 2017-04-05 NOTE — PLAN OF CARE
Problem: Goal Outcome Summary  Goal: Goal Outcome Summary  Outcome: Improving  A/Ox4.  C/o left knee pain.  States controlled with rest, tyl sched, oxy 10 mg prn.  LS left base +fine crackles.  Encourage CDB and IS.  Tolerating diet.  Denies nausea.  States BM 4/3.  Feels regular.  Voiding without difficulty.  Ambulates Ax1 walker belt.  BLE +CMS.  Denies N/T.  VSS.  Pleasant, cooperative.  POC reviewed with pt and pt's friend, roommate.  Questions/concerns answered.

## 2017-04-06 ENCOUNTER — APPOINTMENT (OUTPATIENT)
Dept: PHYSICAL THERAPY | Facility: CLINIC | Age: 72
DRG: 470 | End: 2017-04-06
Attending: ORTHOPAEDIC SURGERY
Payer: COMMERCIAL

## 2017-04-06 ENCOUNTER — APPOINTMENT (OUTPATIENT)
Dept: OCCUPATIONAL THERAPY | Facility: CLINIC | Age: 72
DRG: 470 | End: 2017-04-06
Attending: ORTHOPAEDIC SURGERY
Payer: COMMERCIAL

## 2017-04-06 VITALS
SYSTOLIC BLOOD PRESSURE: 122 MMHG | HEART RATE: 86 BPM | TEMPERATURE: 99.3 F | RESPIRATION RATE: 16 BRPM | OXYGEN SATURATION: 92 % | DIASTOLIC BLOOD PRESSURE: 57 MMHG

## 2017-04-06 LAB
CREAT SERPL-MCNC: 0.67 MG/DL (ref 0.52–1.04)
GFR SERPL CREATININE-BSD FRML MDRD: 86 ML/MIN/1.7M2
PLATELET # BLD AUTO: 200 10E9/L (ref 150–450)

## 2017-04-06 PROCEDURE — 97110 THERAPEUTIC EXERCISES: CPT | Mod: GP | Performed by: PHYSICAL THERAPY ASSISTANT

## 2017-04-06 PROCEDURE — 40000193 ZZH STATISTIC PT WARD VISIT: Performed by: PHYSICAL THERAPY ASSISTANT

## 2017-04-06 PROCEDURE — 85049 AUTOMATED PLATELET COUNT: CPT | Performed by: ORTHOPAEDIC SURGERY

## 2017-04-06 PROCEDURE — 40000133 ZZH STATISTIC OT WARD VISIT: Performed by: OCCUPATIONAL THERAPY ASSISTANT

## 2017-04-06 PROCEDURE — 25000132 ZZH RX MED GY IP 250 OP 250 PS 637: Performed by: ORTHOPAEDIC SURGERY

## 2017-04-06 PROCEDURE — 99232 SBSQ HOSP IP/OBS MODERATE 35: CPT | Performed by: INTERNAL MEDICINE

## 2017-04-06 PROCEDURE — 25000128 H RX IP 250 OP 636: Performed by: ORTHOPAEDIC SURGERY

## 2017-04-06 PROCEDURE — 97116 GAIT TRAINING THERAPY: CPT | Mod: GP | Performed by: PHYSICAL THERAPY ASSISTANT

## 2017-04-06 PROCEDURE — 82565 ASSAY OF CREATININE: CPT | Performed by: ORTHOPAEDIC SURGERY

## 2017-04-06 PROCEDURE — 36415 COLL VENOUS BLD VENIPUNCTURE: CPT | Performed by: ORTHOPAEDIC SURGERY

## 2017-04-06 PROCEDURE — 97530 THERAPEUTIC ACTIVITIES: CPT | Mod: GO | Performed by: OCCUPATIONAL THERAPY ASSISTANT

## 2017-04-06 RX ADMIN — ENOXAPARIN SODIUM 40 MG: 40 INJECTION SUBCUTANEOUS at 09:35

## 2017-04-06 RX ADMIN — OXYCODONE HYDROCHLORIDE 10 MG: 5 TABLET ORAL at 06:15

## 2017-04-06 RX ADMIN — OXYCODONE HYDROCHLORIDE 10 MG: 5 TABLET ORAL at 01:58

## 2017-04-06 RX ADMIN — OXYCODONE HYDROCHLORIDE 10 MG: 5 TABLET ORAL at 09:35

## 2017-04-06 RX ADMIN — THYROID, PORCINE 90 MG: 30 TABLET ORAL at 09:34

## 2017-04-06 RX ADMIN — ACETAMINOPHEN 975 MG: 325 TABLET, FILM COATED ORAL at 09:34

## 2017-04-06 RX ADMIN — SENNOSIDES AND DOCUSATE SODIUM 2 TABLET: 8.6; 5 TABLET ORAL at 09:35

## 2017-04-06 NOTE — PLAN OF CARE
Problem: Goal Outcome Summary  Goal: Goal Outcome Summary  Pt a&ox4, VSS, CMS intact, dressing c/d/i.  Given oxy for pain.  Pt progressing per plan of care.

## 2017-04-06 NOTE — PLAN OF CARE
Problem: Goal Outcome Summary  Goal: Goal Outcome Summary  Surgeon Discharge Plan: Home     Current Functional Status: Pt educated on safety with completing tub transfer with use of AE, pt completed tub transfer with AE shower chair and grab bars SBA. Pt stated she has all AE need for home. Pt to have assist as needed also for ADLS/IADLS.       Barriers to Plan/Home: none     Pt to d/c home today with assist as needed, GOALS NOT MET, see discharge summary

## 2017-04-06 NOTE — PROGRESS NOTES
[facility]    Hospitalist Progress Note    Date of Service (when I saw the patient): 04/06/2017    Assessment & Plan   Ginger Mcgill is a 71 year old female who was admitted on 4/3/2017 for a left TKA    Summary:  Osteoarthritis both knees, s/p LTKA, doing well  Hashimoto's thyroiditis, on thyroid replacement  Hypertension, treated  Hypokalemia, resolved  Mild blood loss anemia      What I did today:  An  Interval history and exam  -    -    -      DVT Prophylaxis: Enoxaprain (Lovenox) SQ  Code Status: Prior    Disposition: Expected discharge in 1-2 days once ok with her surgeon.    Hay Toledo MD    Interval History   Miss Mcgill is doing well. She has had PT and OT today.  She has had some dizziness but not with therapy. She has no head, chest or abdominal pains. Breathing is ok. She is voiding ok, no BM yet, but not uncomfortable    -Data reviewed today: I reviewed all new labs and imaging results over the last 24 hours. I personally reviewed no images or EKG's today.    Physical Exam   Temp: 99.3  F (37.4  C) Temp src: Oral BP: 122/57 Pulse: 86 Heart Rate: 86 Resp: 16 SpO2: 92 % O2 Device: None (Room air)    There were no vitals filed for this visit.  Vital Signs with Ranges  Temp:  [97.9  F (36.6  C)-99.3  F (37.4  C)] 99.3  F (37.4  C)  Pulse:  [80-86] 86  Heart Rate:  [78-99] 86  Resp:  [16] 16  BP: (122-141)/(56-62) 122/57  SpO2:  [92 %-97 %] 92 %  I/O last 3 completed shifts:  In: 660 [P.O.:660]  Out: -     Constitutional: Alert, sitting in a wheel chair  Respiratory: Lungs are clear to  A&P  Cardiovascular: Regular rhythm, normal S2, S1 and no murmurs or S3, no pitting edema, the left leg is wrapped  GI: good bowel sounds, not tender  Skin/Integumen: no rashes  Other:      Medications        thyroid  90 mg Oral QAM     sodium chloride (PF)  3 mL Intracatheter Q8H     enoxaparin  40 mg Subcutaneous Q24H     acetaminophen  975 mg Oral Q8H     senna-docusate  1-2 tablet Oral BID        Data     Recent Labs  Lab 04/06/17  0556 04/05/17  0638 04/04/17  0600 04/04/17  0150 04/03/17  0900   HGB  --  9.7* 10.6*  --  12.5     --   --   --  242   NA  --   --  137  --  140   POTASSIUM  --   --  4.1 4.5 3.3*   CHLORIDE  --   --  101  --  102   CO2  --   --  28  --  29   BUN  --   --  14  --  16   CR 0.67  --  0.70  --  0.76   ANIONGAP  --   --  8  --  9   YUMI  --   --  8.1*  --  8.8   GLC  --  129* 115*  --  107*       Imaging:  No results found for this or any previous visit (from the past 24 hour(s)).

## 2017-04-06 NOTE — PLAN OF CARE
Problem: Goal Outcome Summary  Goal: Goal Outcome Summary  Outcome: Adequate for Discharge Date Met:  04/06/17  Pt was A & O x 4. Lungs sound clear, bowel sounds active, cms intact. Up with 1 and walker. dressing were changed. Received oxycodone for pain. discharge instructions and meds reviewed and given. Was discharged home.

## 2017-04-06 NOTE — PLAN OF CARE
Problem: Goal Outcome Summary  Goal: Goal Outcome Summary  Surgeon Discharge Plan: Home with OP PT.  Agree with plan.       Current Functional Status: Pt performed bed mobility and sit to/from stand transfers with SBA.  Pt performed gait training x 100 ft using wheeled walker with SBA.  Slow pace.  Good stability without use of KI.  Pt performed 3 stairs using bilateral rails with SBA.     Barriers to Plan/Home: None     Pt is discharging home today with assist of friend and OP PT.  PT goals partially met.

## 2017-04-06 NOTE — PLAN OF CARE
Problem: Goal Outcome Summary  Goal: Goal Outcome Summary  Outcome: Improving  VSS, A&Ox4. CMS intact, dressing CDI. Up with one and walker to BR, immobolizer on overnight. Pain controlled with oxycodone and scheduled tylenol. Pt plans to discharge home today. Will continue to monitor.

## 2017-04-06 NOTE — PLAN OF CARE
Problem: Goal Outcome Summary  Goal: Goal Outcome Summary  Occupational Therapy Discharge Summary     Reason for therapy discharge:    Discharged to home.     Progress towards therapy goal(s). See goals on Care Plan in [facility] electronic health record for goal details.  Goals not met.  Barriers to achieving goals:   discharge from facility.     Therapy recommendation(s):    Recommend assist as needed for I/ADLs

## 2017-07-28 ENCOUNTER — DOCUMENTATION ONLY (OUTPATIENT)
Dept: OTHER | Facility: CLINIC | Age: 72
End: 2017-07-28

## 2017-07-28 PROBLEM — Z71.89 ACP (ADVANCE CARE PLANNING): Chronic | Status: ACTIVE | Noted: 2017-07-28

## 2018-08-11 ENCOUNTER — HOSPITAL ENCOUNTER (EMERGENCY)
Facility: CLINIC | Age: 73
Discharge: HOME | End: 2018-08-11
Attending: EMERGENCY MEDICINE | Admitting: EMERGENCY MEDICINE
Payer: COMMERCIAL

## 2018-08-11 ENCOUNTER — APPOINTMENT (OUTPATIENT)
Dept: CT IMAGING | Facility: CLINIC | Age: 73
End: 2018-08-11
Attending: EMERGENCY MEDICINE
Payer: COMMERCIAL

## 2018-08-11 VITALS
DIASTOLIC BLOOD PRESSURE: 66 MMHG | RESPIRATION RATE: 16 BRPM | SYSTOLIC BLOOD PRESSURE: 140 MMHG | HEIGHT: 64 IN | WEIGHT: 162 LBS | OXYGEN SATURATION: 95 % | BODY MASS INDEX: 27.66 KG/M2 | TEMPERATURE: 98.6 F

## 2018-08-11 DIAGNOSIS — S06.0X0A CLOSED HEAD INJURY WITH CONCUSSION, WITHOUT LOSS OF CONSCIOUSNESS, INITIAL ENCOUNTER: ICD-10-CM

## 2018-08-11 PROCEDURE — 99284 EMERGENCY DEPT VISIT MOD MDM: CPT | Mod: 25

## 2018-08-11 PROCEDURE — 70450 CT HEAD/BRAIN W/O DYE: CPT

## 2018-08-11 ASSESSMENT — ENCOUNTER SYMPTOMS
WOUND: 1
VOMITING: 0
CONFUSION: 0
HEADACHES: 0
COLOR CHANGE: 1

## 2018-08-11 NOTE — ED AVS SNAPSHOT
Emergency Department    [address] [place] [address]    Phone:  [phone]    Fax:  [phone]                                       Ginger Mcgill   MRN: [id number]    Department:   Emergency Department   Date of Visit:  8/11/2018           After Visit Summary Signature Page     I have received my discharge instructions, and my questions have been answered. I have discussed any challenges I see with this plan with the nurse or doctor.    ..........................................................................................................................................  Patient/Patient Representative Signature      ..........................................................................................................................................  Patient Representative Print Name and Relationship to Patient    ..................................................               ................................................  Date                                            Time    ..........................................................................................................................................  Reviewed by Signature/Title    ...................................................              ..............................................  Date                                                            Time

## 2018-08-11 NOTE — ED PROVIDER NOTES
"  History     Chief Complaint:  Fall      HPI   Ginger Mcgill is a 72 year old female who presents with fall. Patient states she missed a step and fell onto the right side of her head. She denies any syncope, confusion, headaches, vomiting, or pain anywhere else. Patient took ibuprofen for her pain.     Allergies:  Erythromycin  Sulfa drugs    Medications:    Tylenol   [place] thyroid  Chlorthalidone  Naproxen sodium  Senokot     Past Medical History:    Arthritis   Hashimoto's thyroiditis   Hyperlipidemia  Hypertension   Hypothyroid   Appendicitis     Past Surgical History:    Arthroplasty knee  Laparoscopic appendectomy   Thyroid surgery     Family History:    Diabetes  Heart disease  Hypertension    Social History:  The patient was accompanied to the ED by family member.  Smoking Status: No  Smokeless Tobacco: No  Alcohol Use: Yes  Marital Status:  Single [1]    Review of Systems   Gastrointestinal: Negative for vomiting.   Skin: Positive for color change and wound.   Neurological: Negative for syncope and headaches.   Psychiatric/Behavioral: Negative for confusion.   All other systems reviewed and are negative.    Physical Exam   Vitals:  Patient Vitals for the past 24 hrs:   BP Temp Temp src Heart Rate Resp SpO2 Height Weight   08/11/18 1030 140/66 - - - - 95 % - -   08/11/18 1015 146/70 - - - - 95 % - -   08/11/18 1002 152/75 - - - - 96 % - -   08/11/18 0958 163/80 98.6  F (37  C) Oral 74 16 96 % 1.626 m (5' 4\") 73.5 kg (162 lb)         Physical Exam  Nursing note and vitals reviewed.  Constitutional:  Appears well-developed and well-nourished.   HENT:   Head:    Moderate sized hematoma to the right side of her forehead with    ecchymosis and swelling with a very superficial abrasion. No bone    deformity. TMs are clear.   Mouth/Throat:   Oropharynx is clear and moist. No oropharyngeal exudate.   Eyes:    Pupils are equal, round, and reactive to light.   Neck:    Normal range of motion. Neck supple. Non " tender.      No tracheal deviation present. No thyromegaly present.   Cardiovascular:  Normal rate, regular rhythm, no murmur   Pulmonary/Chest: Breath sounds are clear and equal without wheezes or crackles.  Abdominal:   Soft. Bowel sounds are normal. Exhibits no distension and      no mass. There is no tenderness.      There is no rebound and no guarding.   Musculoskeletal:  Exhibits no edema. Non tender arms and legs.   Lymphadenopathy:  No cervical adenopathy.   Neurological:   Alert and oriented to person, place, and time. GCS 15.  CN 2-12 intact.     and proximal upper extremity strength strong and equal.  Bilateral lower    extremity strength strong and equal, including strong dorsiflexion and    plantarflexion strength.  Sensation intact and equal to the face, arms and    legs.  No facial droop or weakness. Normal speech.  Follows commands    and answers questions normally.    Skin:    Skin is warm and dry. No rash noted. No pallor.   Emergency Department Course     Imaging:  Radiology findings were communicated with the patient who voiced understanding of the findings.  Head CT   IMPRESSION:     1. No evidence of acute intracranial hemorrhage, mass, or herniation.  2. Right frontal scalp soft tissue swelling without underlying  Fracture.  Reading per radiology.    Emergency Department Course:  Nursing notes and vitals reviewed.  I performed an exam of the patient as documented above.   The patient was sent for a head CT while in the emergency department, results above.     1029 check in with the patient.     I personally answered all related questions prior to discharge.    Findings and plan explained to the patient. Patient discharged home with instructions regarding supportive care, medications, and reasons to return. The importance of close follow-up was reviewed.     Impression & Plan      Medical Decision Making:  MDM  Number of Diagnoses or Management Options  Closed head injury with concussion,  "without loss of consciousness, initial encounter:   This patient presents with a history and clinical exam consistent with concussion, which is a clinical diagnosis. The differential diagnosis includes skull fracture, epidural hematoma, subdural hematoma, intracerebral hemorrhage, and traumatic subarachnoid hemorrhage; these diagnoses were not detected during this visit on CT imaging.  Despite the normal neuroimaging,  the patient/family understands that they must return if any \"red flags\" appear/develop in the coming hours/days, as this may represent an indication to perform another CT scan.  I have noted that \"red flags\" include: headaches that get worse, increased drowsiness, strange behavior, repetitive speech, seizures, repeated vomiting, growing confusion, increased irritability, slurred speech, weakness or numbness, and loss of responsiveness. Although rare, delayed CNS bleeds can occur, and the patient was notified of this.   I have discussed the second impact syndrome, and the importance of not sustaining repeated concussions in the next 1-2 weeks.  Post concussive syndrome is also discussed.  Concussion information will also be provided in writing at discharge detailing this.    Assessment:  Concussion  Closed head injury  Headache    Plan:  F/U with primary care MD in 1-2 days as needed, return to ED immediately with symptoms as discussed  To be observed for the next 24 hours by responsible adult      Diagnosis:    ICD-10-CM    1. Closed head injury with concussion, without loss of consciousness, initial encounter S06.0X0A        Disposition:   Discharged    CMS Diagnoses: None     Scribe Disclosure:  Kim SCHUMACHER, am serving as a scribe at 9:55 AM on 8/11/2018 to document services personally performed by Denise Galarza MD, based on my observations and the provider's statements to me.   EMERGENCY DEPARTMENT        Denise Galarza MD  08/11/18 1426    "

## 2018-08-11 NOTE — ED AVS SNAPSHOT
Emergency Department    [address] [facility] [address]    Phone:  [phone]    Fax:  [phone]                                       Ginger Mcgill   MRN: [id number]    Department:   Emergency Department   Date of Visit:  8/11/2018           Patient Information     Date Of Birth          1945        Your diagnoses for this visit were:     Closed head injury with concussion, without loss of consciousness, initial encounter        You were seen by Denise Galarza MD.      Follow-up Information     Follow up with  Emergency Department.    Specialty:  EMERGENCY MEDICINE    Why:  As needed, If symptoms worsen or for confusion or vomiting    Contact information:    [address] [address] [phone]        Follow up with Crys Montes MD.    Specialty:  Internal Medicine    Why:  in 2-3 days for recheck    Contact information:    [facility]  [address]  [facility] [address] [phone]        Discharge References/Attachments     CONCUSSION (ENGLISH)    HEAD INJURY WITH SLEEP MONITORING (ADULT) (ENGLISH)      24 Hour Appointment Hotline       To make an appointment at any [facility], call [phone] ([phone]). If you don't have a family doctor or clinic, we will help you find one. [facility] clinics are conveniently located to serve the needs of you and your family.             Review of your medicines      Our records show that you are taking the medicines listed below. If these are incorrect, please call your family doctor or clinic.        Dose / Directions Last dose taken    ALEVE PO   Dose:  220-440 mg        Take 220-440 mg by mouth daily as needed for moderate pain   Refills:  0        ARMOUR THYROID PO   Dose:  90 mg        Take 90 mg by mouth every morning   Refills:  0        CALCIUM-MAGNESIUM PO   Dose:  3 tablet        Take 3 tablets by mouth every evening Standard Process Calcium Lactate   Refills:   0        CHLORTHALIDONE PO   Dose:  12.5 mg        Take 12.5 mg by mouth daily (Takes 0.5 x 25mg tablet = 12.5mg dose)   Refills:  0        FISH OIL PO   Dose:  3 capsule        Take 3 capsules by mouth every morning   Refills:  0        K-DUR PO   Dose:  10 mEq        Take 10 mEq by mouth daily (with breakfast)   Refills:  0        MAGNESIUM CITRATE PO   Dose:  100 mg        Take 100 mg by mouth every morning   Refills:  0        NONFORMULARY        Apply topically daily CM Response Joint Cream - knees   Refills:  0        NUTRITIONAL SUPPLEMENT PO   Dose:  3 tablet        Take 3 tablets by mouth 2 times daily L.E.F. Multivitamin   Refills:  0        order for DME   Quantity:  1 each        Equipment being ordered: Walker Wheels () and Walker () Treatment Diagnosis: impaired gait   Refills:  0        oxyCODONE IR 5 MG tablet   Commonly known as:  ROXICODONE   Dose:  5-10 mg   Quantity:  40 tablet        Take 1-2 tablets (5-10 mg) by mouth every 3 hours as needed for moderate to severe pain   Refills:  0        PROBIOTIC PO   Dose:  2 capsule        Take 2 capsules by mouth At Bedtime L.E. FlorAssist Balance   Refills:  0        senna-docusate 8.6-50 MG per tablet   Commonly known as:  SENOKOT-S;PERICOLACE   Dose:  1-2 tablet   Quantity:  50 tablet        Take 1-2 tablets by mouth 2 times daily   Refills:  0        TYLENOL PO   Dose:  325 mg        Take 325 mg by mouth daily as needed for mild pain or fever   Refills:  0        UBIQUINOL PO   Dose:  100 mg        Take 100 mg by mouth every morning   Refills:  0        VITAMIN D PO   Dose:  2000 Units        Take 2,000 Units by mouth every morning Liquid Formulation (takes 2 drops x 1000unit/drop = 2000 units)   Refills:  0                Procedures and tests performed during your visit     Head CT w/o contrast      Orders Needing Specimen Collection     None      Pending Results     Date and Time Order Name Status Description    8/11/2018 1002 Head CT w/o  contrast Preliminary             Pending Culture Results     No orders found from 8/9/2018 to 8/12/2018.            Pending Results Instructions     If you had any lab results that were not finalized at the time of your Discharge, you can call the ED Lab Result RN at [phone]. You will be contacted by this team for any positive Lab results or changes in treatment. The nurses are available 7 days a week from 10A to 6:30P.  You can leave a message 24 hours per day and they will return your call.        Test Results From Your Hospital Stay        8/11/2018 10:19 AM      Narrative     CT SCAN OF THE HEAD WITHOUT CONTRAST   8/11/2018 10:13 AM     HISTORY:  Check for traumatic bleed or frontal/temporal skull  fracture. Hit right forehead during fall.     TECHNIQUE:  Axial images of the head and coronal reformations without  IV contrast material. Radiation dose for this scan was reduced using  automated exposure control, adjustment of the mA and/or kV according  to patient size, or iterative reconstruction technique.    COMPARISON: None.    FINDINGS: There is no evidence of intracranial hemorrhage, mass, acute  infarct or anomaly. The ventricles are normal in size, shape and  configuration. The brain parenchyma and subarachnoid spaces are  normal.     The visualized portions of the sinuses and mastoids appear normal.    Right frontal scalp soft tissue swelling. No underlying fracture.        Impression     IMPRESSION:     1. No evidence of acute intracranial hemorrhage, mass, or herniation.  2. Right frontal scalp soft tissue swelling without underlying  fracture.                Clinical Quality Measure: Blood Pressure Screening     Your blood pressure was checked while you were in the emergency department today. The last reading we obtained was  BP: 146/70 . Please read the guidelines below about what these numbers mean and what you should do about them.  If your systolic blood pressure (the top number) is less than  120 and your diastolic blood pressure (the bottom number) is less than 80, then your blood pressure is normal. There is nothing more that you need to do about it.  If your systolic blood pressure (the top number) is 120-139 or your diastolic blood pressure (the bottom number) is 80-89, your blood pressure may be higher than it should be. You should have your blood pressure rechecked within a year by a primary care provider.  If your systolic blood pressure (the top number) is 140 or greater or your diastolic blood pressure (the bottom number) is 90 or greater, you may have high blood pressure. High blood pressure is treatable, but if left untreated over time it can put you at risk for heart attack, stroke, or kidney failure. You should have your blood pressure rechecked by a primary care provider within the next 4 weeks.  If your provider in the emergency department today gave you specific instructions to follow-up with your doctor or provider even sooner than that, you should follow that instruction and not wait for up to 4 weeks for your follow-up visit.        Thank you for choosing [facility]       Thank you for choosing [facility] for your care. Our goal is always to provide you with excellent care. Hearing back from our patients is one way we can continue to improve our services. Please take a few minutes to complete the written survey that you may receive in the mail after you visit with us. Thank you!        [facility] Information     [facility] gives you secure access to your electronic health record. If you see a primary care provider, you can also send messages to your care team and make appointments. If you have questions, please call your primary care clinic.  If you do not have a primary care provider, please call [phone] and they will assist you.        Care EveryWhere ID     This is your Care EveryWhere ID. This could be used by other organizations to access your [facility] medical records  [id number]         Equal Access to Services     SHLOMO SORIA : Maria Del Carmen Antunez, thaddeus girno, geovani garcia. So [facility] [phone].    ATENCIÓN: Si habla español, tiene a villanueva disposición servicios gratuitos de asistencia lingüística. Llame al [phone].    We comply with applicable federal civil rights laws and [place] laws. We do not discriminate on the basis of race, color, national origin, age, disability, sex, sexual orientation, or gender identity.            After Visit Summary       This is your record. Keep this with you and show to your community pharmacist(s) and doctor(s) at your next visit.

## 2019-10-04 ENCOUNTER — HEALTH MAINTENANCE LETTER (OUTPATIENT)
Age: 74
End: 2019-10-04

## 2020-02-08 ENCOUNTER — HEALTH MAINTENANCE LETTER (OUTPATIENT)
Age: 75
End: 2020-02-08

## 2020-03-09 ENCOUNTER — OFFICE VISIT (OUTPATIENT)
Dept: URGENT CARE | Facility: URGENT CARE | Age: 75
End: 2020-03-09
Payer: COMMERCIAL

## 2020-03-09 ENCOUNTER — ANCILLARY PROCEDURE (OUTPATIENT)
Dept: GENERAL RADIOLOGY | Facility: CLINIC | Age: 75
End: 2020-03-09
Attending: PHYSICIAN ASSISTANT
Payer: COMMERCIAL

## 2020-03-09 VITALS
RESPIRATION RATE: 16 BRPM | DIASTOLIC BLOOD PRESSURE: 84 MMHG | SYSTOLIC BLOOD PRESSURE: 144 MMHG | TEMPERATURE: 99.4 F | OXYGEN SATURATION: 98 % | BODY MASS INDEX: 28.15 KG/M2 | WEIGHT: 164 LBS | HEART RATE: 87 BPM

## 2020-03-09 DIAGNOSIS — R05.9 COUGH: ICD-10-CM

## 2020-03-09 DIAGNOSIS — R50.9 FEVER AND CHILLS: ICD-10-CM

## 2020-03-09 DIAGNOSIS — R52 BODY ACHES: Primary | ICD-10-CM

## 2020-03-09 DIAGNOSIS — R52 BODY ACHES: ICD-10-CM

## 2020-03-09 DIAGNOSIS — R09.89 CHEST CONGESTION: ICD-10-CM

## 2020-03-09 LAB
BASOPHILS # BLD AUTO: 0 10E9/L (ref 0–0.2)
BASOPHILS NFR BLD AUTO: 0.3 %
DEPRECATED S PYO AG THROAT QL EIA: NEGATIVE
DIFFERENTIAL METHOD BLD: NORMAL
EOSINOPHIL # BLD AUTO: 0.1 10E9/L (ref 0–0.7)
EOSINOPHIL NFR BLD AUTO: 0.7 %
ERYTHROCYTE [DISTWIDTH] IN BLOOD BY AUTOMATED COUNT: 12.6 % (ref 10–15)
FLUAV+FLUBV AG SPEC QL: NEGATIVE
FLUAV+FLUBV AG SPEC QL: NEGATIVE
HCT VFR BLD AUTO: 40.4 % (ref 35–47)
HGB BLD-MCNC: 13.7 G/DL (ref 11.7–15.7)
LYMPHOCYTES # BLD AUTO: 0.9 10E9/L (ref 0.8–5.3)
LYMPHOCYTES NFR BLD AUTO: 10.2 %
MCH RBC QN AUTO: 30.2 PG (ref 26.5–33)
MCHC RBC AUTO-ENTMCNC: 33.9 G/DL (ref 31.5–36.5)
MCV RBC AUTO: 89 FL (ref 78–100)
MONOCYTES # BLD AUTO: 0.7 10E9/L (ref 0–1.3)
MONOCYTES NFR BLD AUTO: 7.8 %
NEUTROPHILS # BLD AUTO: 7 10E9/L (ref 1.6–8.3)
NEUTROPHILS NFR BLD AUTO: 81 %
PLATELET # BLD AUTO: 245 10E9/L (ref 150–450)
RBC # BLD AUTO: 4.54 10E12/L (ref 3.8–5.2)
SPECIMEN SOURCE: NORMAL
STREP GROUP A PCR: NOT DETECTED
WBC # BLD AUTO: 8.7 10E9/L (ref 4–11)

## 2020-03-09 PROCEDURE — 87804 INFLUENZA ASSAY W/OPTIC: CPT | Performed by: FAMILY MEDICINE

## 2020-03-09 PROCEDURE — 40001204 ZZHCL STATISTIC STREP A RAPID: Performed by: FAMILY MEDICINE

## 2020-03-09 PROCEDURE — 99204 OFFICE O/P NEW MOD 45 MIN: CPT | Performed by: PHYSICIAN ASSISTANT

## 2020-03-09 PROCEDURE — 85025 COMPLETE CBC W/AUTO DIFF WBC: CPT | Performed by: PHYSICIAN ASSISTANT

## 2020-03-09 PROCEDURE — 71046 X-RAY EXAM CHEST 2 VIEWS: CPT

## 2020-03-09 PROCEDURE — 36415 COLL VENOUS BLD VENIPUNCTURE: CPT | Performed by: PHYSICIAN ASSISTANT

## 2020-03-09 PROCEDURE — 87651 STREP A DNA AMP PROBE: CPT | Performed by: FAMILY MEDICINE

## 2020-03-09 RX ORDER — HYDROCHLOROTHIAZIDE 50 MG/1
50 TABLET ORAL DAILY
COMMUNITY

## 2020-03-09 RX ORDER — POTASSIUM CHLORIDE 750 MG/1
TABLET, EXTENDED RELEASE ORAL
COMMUNITY
Start: 2020-01-30

## 2020-03-09 RX ORDER — THYROID 90 MG/1
TABLET ORAL
COMMUNITY

## 2020-03-09 RX ORDER — CEFDINIR 300 MG/1
300 CAPSULE ORAL 2 TIMES DAILY
Qty: 20 CAPSULE | Refills: 0 | Status: SHIPPED | OUTPATIENT
Start: 2020-03-09 | End: 2020-03-19

## 2020-03-10 NOTE — PROGRESS NOTES
SUBJECTIVE:   Ginger Mcgill is a 74 year old female presenting with a chief complaint of fever, chills, runny nose, stuffy nose, cough - non-productive, headache and body aches.  Onset of symptoms was 1 day(s) ago.  Course of illness is same.    Severity moderate  Current and Associated symptoms: fever, chills, runny nose, stuffy nose, cough - non-productive and body aches  Treatment measures tried include Tylenol/Ibuprofen.  Predisposing factors include recent illness.    Past Medical History:   Diagnosis Date     Arthritis     Osteoarthritis     Hashimoto's thyroiditis      Hyperlipemia      Hypertension      Hypothyroid         Allergies   Allergen Reactions     Erythromycin Hives     Sulfa Drugs Hives     Family History   Problem Relation Age of Onset     Diabetes Mother      Heart Disease Mother      Hypertension Mother      Heart Disease Father      Heart Disease Brother      Diabetes Sister        Social History     Tobacco Use     Smoking status: Never Smoker     Smokeless tobacco: Never Used   Substance Use Topics     Alcohol use: Yes     Alcohol/week: 2.5 standard drinks     Types: 3 Standard drinks or equivalent per week     Comment: rarely       ROS:  CONSTITUTIONAL:POSITIVE  for fever  INTEGUMENTARY/SKIN: NEGATIVE for worrisome rashes, moles or lesions  EYES: NEGATIVE for vision changes or irritation  ENT/MOUTH: POSITIVE for nasal congestion, runny nose  RESP:POSITIVE for cough-non productive  CV: NEGATIVE for chest pain, palpitations or peripheral edema  GI: NEGATIVE for nausea, abdominal pain, heartburn, or change in bowel habits  : negative for and dysuria  MUSCULOSKELETAL: POSITIVE  for body aches  NEURO: NEGATIVE for weakness, dizziness or paresthesias    OBJECTIVE  :BP (!) 144/84   Pulse 87   Temp 99.4  F (37.4  C) (Tympanic)   Resp 16   Wt 74.4 kg (164 lb)   SpO2 98%   BMI 28.15 kg/m    GENERAL APPEARANCE: healthy, alert and no distress  EYES: EOMI,  PERRL, conjunctiva clear  HENT:  TM's normal bilaterally and rhinorrhea clear  NECK: supple, nontender, no lymphadenopathy  RESP: lungs clear to auscultation - no rales, rhonchi or wheezes  CV: regular rates and rhythm, normal S1 S2, no murmur noted  ABDOMEN:  soft, nontender, no HSM or masses and bowel sounds normal  Extremities: no peripheral edema or tenderness, peripheral pulses normal  MS: extremities normal- no gross deformities noted, no erythema, FROM noted in all extremities  NEURO: Normal strength and tone, sensory exam grossly normal,  normal speech and mentation  SKIN: no suspicious lesions or rashes    Results for orders placed or performed in visit on 03/09/20   XR Chest 2 Views     Status: None    Narrative    CHEST TWO VIEWS  3/9/2020 1:45 PM     HISTORY:  Cough. Fever and chills. Body aches.    COMPARISON: None.      Impression    IMPRESSION: Negative chest. Lungs clear.    MAIKOL GREER MD   Results for orders placed or performed in visit on 03/09/20   CBC with platelets differential     Status: None   Result Value Ref Range    WBC 8.7 4.0 - 11.0 10e9/L    RBC Count 4.54 3.8 - 5.2 10e12/L    Hemoglobin 13.7 11.7 - 15.7 g/dL    Hematocrit 40.4 35.0 - 47.0 %    MCV 89 78 - 100 fl    MCH 30.2 26.5 - 33.0 pg    MCHC 33.9 31.5 - 36.5 g/dL    RDW 12.6 10.0 - 15.0 %    Platelet Count 245 150 - 450 10e9/L    % Neutrophils 81.0 %    % Lymphocytes 10.2 %    % Monocytes 7.8 %    % Eosinophils 0.7 %    % Basophils 0.3 %    Absolute Neutrophil 7.0 1.6 - 8.3 10e9/L    Absolute Lymphocytes 0.9 0.8 - 5.3 10e9/L    Absolute Monocytes 0.7 0.0 - 1.3 10e9/L    Absolute Eosinophils 0.1 0.0 - 0.7 10e9/L    Absolute Basophils 0.0 0.0 - 0.2 10e9/L    Diff Method Automated Method    Influenza A/B antigen     Status: None   Result Value Ref Range    Influenza A/B Agn Specimen Nasal     Influenza A Negative NEG^Negative    Influenza B Negative NEG^Negative   Streptococcus A Rapid Scr w Reflx to PCR     Status: None    Specimen: Throat   Result Value Ref  Range    Strep Specimen Description Throat     Streptococcus Group A Rapid Screen Negative NEG^Negative   Group A Streptococcus PCR Throat Swab     Status: None    Specimen: Throat   Result Value Ref Range    Specimen Description Throat     Strep Group A PCR Not Detected NDET^Not Detected       ASSESSMENT/PLAN      ICD-10-CM    1. Body aches  R52 Influenza A/B antigen     Streptococcus A Rapid Scr w Reflx to PCR     Group A Streptococcus PCR Throat Swab     CBC with platelets differential     XR Chest 2 Views     Group A Streptococcus PCR Throat Swab   2. Fever and chills  R50.9 CBC with platelets differential     XR Chest 2 Views   3. Cough  R05 CBC with platelets differential     XR Chest 2 Views   4. Chest congestion  R09.89 cefdinir (OMNICEF) 300 MG capsule       Orders Placed This Encounter     XR Chest 2 Views     CBC with platelets differential     thyroid (ARMOUR) 90 MG tablet     potassium chloride ER (K-TAB/KLOR-CON) 10 MEQ CR tablet     hydrochlorothiazide (HYDRODIURIL) 50 MG tablet     cefdinir (OMNICEF) 300 MG capsule       Patient given antibiotics to help prevent bronchitis or pneumonia  Chest ray negative for pneumonia  CBC negative for infection  Fluids, rest  Follow up as needed

## 2020-11-08 ENCOUNTER — HEALTH MAINTENANCE LETTER (OUTPATIENT)
Age: 75
End: 2020-11-08

## 2021-03-01 ENCOUNTER — IMMUNIZATION (OUTPATIENT)
Dept: NURSING | Facility: CLINIC | Age: 76
End: 2021-03-01
Payer: COMMERCIAL

## 2021-03-01 PROCEDURE — 91301 PR COVID VAC MODERNA 100 MCG/0.5 ML IM: CPT

## 2021-03-01 PROCEDURE — 0011A PR COVID VAC MODERNA 100 MCG/0.5 ML IM: CPT

## 2021-03-27 ENCOUNTER — HEALTH MAINTENANCE LETTER (OUTPATIENT)
Age: 76
End: 2021-03-27

## 2021-03-29 ENCOUNTER — IMMUNIZATION (OUTPATIENT)
Dept: NURSING | Facility: CLINIC | Age: 76
End: 2021-03-29
Attending: INTERNAL MEDICINE
Payer: COMMERCIAL

## 2021-03-29 PROCEDURE — 91301 PR COVID VAC MODERNA 100 MCG/0.5 ML IM: CPT

## 2021-03-29 PROCEDURE — 0012A PR COVID VAC MODERNA 100 MCG/0.5 ML IM: CPT

## 2021-09-11 ENCOUNTER — HEALTH MAINTENANCE LETTER (OUTPATIENT)
Age: 76
End: 2021-09-11

## 2021-11-06 ENCOUNTER — HEALTH MAINTENANCE LETTER (OUTPATIENT)
Age: 76
End: 2021-11-06

## 2022-04-23 ENCOUNTER — HEALTH MAINTENANCE LETTER (OUTPATIENT)
Age: 77
End: 2022-04-23

## 2022-10-29 ENCOUNTER — HEALTH MAINTENANCE LETTER (OUTPATIENT)
Age: 77
End: 2022-10-29

## 2023-06-01 ENCOUNTER — HEALTH MAINTENANCE LETTER (OUTPATIENT)
Age: 78
End: 2023-06-01

## 2025-02-12 ENCOUNTER — APPOINTMENT (OUTPATIENT)
Dept: URBAN - METROPOLITAN AREA CLINIC 255 | Age: 80
Setting detail: DERMATOLOGY
End: 2025-02-14

## 2025-02-12 DIAGNOSIS — Z71.89 OTHER SPECIFIED COUNSELING: ICD-10-CM

## 2025-02-12 DIAGNOSIS — L57.8 OTHER SKIN CHANGES DUE TO CHRONIC EXPOSURE TO NONIONIZING RADIATION: ICD-10-CM

## 2025-02-12 DIAGNOSIS — D22 MELANOCYTIC NEVI: ICD-10-CM

## 2025-02-12 DIAGNOSIS — D18.0 HEMANGIOMA: ICD-10-CM

## 2025-02-12 DIAGNOSIS — L82.1 OTHER SEBORRHEIC KERATOSIS: ICD-10-CM

## 2025-02-12 PROBLEM — D22.72 MELANOCYTIC NEVI OF LEFT LOWER LIMB, INCLUDING HIP: Status: ACTIVE | Noted: 2025-02-12

## 2025-02-12 PROBLEM — D18.01 HEMANGIOMA OF SKIN AND SUBCUTANEOUS TISSUE: Status: ACTIVE | Noted: 2025-02-12

## 2025-02-12 PROBLEM — D22.5 MELANOCYTIC NEVI OF TRUNK: Status: ACTIVE | Noted: 2025-02-12

## 2025-02-12 PROBLEM — D22.71 MELANOCYTIC NEVI OF RIGHT LOWER LIMB, INCLUDING HIP: Status: ACTIVE | Noted: 2025-02-12

## 2025-02-12 PROBLEM — D22.62 MELANOCYTIC NEVI OF LEFT UPPER LIMB, INCLUDING SHOULDER: Status: ACTIVE | Noted: 2025-02-12

## 2025-02-12 PROBLEM — D22.61 MELANOCYTIC NEVI OF RIGHT UPPER LIMB, INCLUDING SHOULDER: Status: ACTIVE | Noted: 2025-02-12

## 2025-02-12 PROCEDURE — OTHER SUNSCREEN RECOMMENDATIONS: OTHER

## 2025-02-12 PROCEDURE — OTHER MIPS QUALITY: OTHER

## 2025-02-12 PROCEDURE — 99203 OFFICE O/P NEW LOW 30 MIN: CPT

## 2025-02-12 PROCEDURE — OTHER COUNSELING: OTHER

## 2025-02-12 PROCEDURE — OTHER PATIENT SPECIFIC COUNSELING: OTHER

## 2025-02-12 ASSESSMENT — LOCATION SIMPLE DESCRIPTION DERM
LOCATION SIMPLE: RIGHT FOREARM
LOCATION SIMPLE: LEFT LOWER BACK
LOCATION SIMPLE: LEFT UPPER ARM
LOCATION SIMPLE: UPPER BACK
LOCATION SIMPLE: LEFT FOREARM
LOCATION SIMPLE: LEFT CHEEK
LOCATION SIMPLE: LEFT THIGH
LOCATION SIMPLE: ABDOMEN
LOCATION SIMPLE: RIGHT THIGH
LOCATION SIMPLE: CHEST
LOCATION SIMPLE: LEFT UPPER BACK

## 2025-02-12 ASSESSMENT — LOCATION ZONE DERM
LOCATION ZONE: ARM
LOCATION ZONE: TRUNK
LOCATION ZONE: LEG
LOCATION ZONE: FACE

## 2025-02-12 ASSESSMENT — LOCATION DETAILED DESCRIPTION DERM
LOCATION DETAILED: LEFT MEDIAL UPPER BACK
LOCATION DETAILED: RIGHT PROXIMAL DORSAL FOREARM
LOCATION DETAILED: LEFT ANTERIOR PROXIMAL THIGH
LOCATION DETAILED: RIGHT VENTRAL DISTAL FOREARM
LOCATION DETAILED: LEFT INFERIOR LATERAL MIDBACK
LOCATION DETAILED: LEFT ANTERIOR DISTAL THIGH
LOCATION DETAILED: LEFT VENTRAL PROXIMAL FOREARM
LOCATION DETAILED: RIGHT VENTRAL PROXIMAL FOREARM
LOCATION DETAILED: INFERIOR THORACIC SPINE
LOCATION DETAILED: MIDDLE STERNUM
LOCATION DETAILED: EPIGASTRIC SKIN
LOCATION DETAILED: RIGHT ANTERIOR DISTAL THIGH
LOCATION DETAILED: LEFT VENTRAL DISTAL FOREARM
LOCATION DETAILED: LEFT ANTECUBITAL SKIN
LOCATION DETAILED: LEFT INFERIOR CENTRAL MALAR CHEEK
LOCATION DETAILED: LEFT DISTAL DORSAL FOREARM
LOCATION DETAILED: RIGHT ANTERIOR PROXIMAL THIGH

## 2025-06-30 ENCOUNTER — TELEPHONE (OUTPATIENT)
Dept: OTHER | Facility: CLINIC | Age: 80
End: 2025-06-30
Payer: COMMERCIAL

## 2025-06-30 NOTE — TELEPHONE ENCOUNTER
[facility]    Who is the name of the provider?:  NONE   What is the location you see this provider at/preferred location?: [place]  Person calling / Facility: Ginger Mcgill  Phone number:  [phone] (home)   Nurse call back needed:  Scheduling      Reason for call:  Patient called as a self referral requesting to see Dr. Taylor. Writer explained referral/triage process and that patient may have to see a medicine provider - patient verbalized understanding and agreed to move forward with scheduling process.    Patient wants to see a provider due to current leg swelling she is experiencing in addition to having family history of vascular issues.    Please advise on scheduling and route back     6/30/2025, 11:01 AM

## 2025-07-01 NOTE — TELEPHONE ENCOUNTER
Routing to scheduling to coordinate the following:    NEW VASCULAR PATIENT consult with Vascular Medicine  Please schedule this at next available      Appt note:  Self referral for leg swelling    Maira CARBONE, REE    [facility]  Office: [phone]  Fax: [phone]

## 2025-07-30 ENCOUNTER — OFFICE VISIT (OUTPATIENT)
Dept: OTHER | Facility: CLINIC | Age: 80
End: 2025-07-30
Attending: INTERNAL MEDICINE
Payer: COMMERCIAL

## 2025-07-30 VITALS
OXYGEN SATURATION: 97 % | SYSTOLIC BLOOD PRESSURE: 125 MMHG | DIASTOLIC BLOOD PRESSURE: 77 MMHG | WEIGHT: 163 LBS | BODY MASS INDEX: 27.98 KG/M2 | HEART RATE: 77 BPM

## 2025-07-30 DIAGNOSIS — I10 BENIGN ESSENTIAL HYPERTENSION: ICD-10-CM

## 2025-07-30 DIAGNOSIS — R73.01 IFG (IMPAIRED FASTING GLUCOSE): ICD-10-CM

## 2025-07-30 DIAGNOSIS — E06.3 HYPOTHYROIDISM DUE TO HASHIMOTO THYROIDITIS: ICD-10-CM

## 2025-07-30 DIAGNOSIS — I89.0 LYMPHEDEMA: Primary | ICD-10-CM

## 2025-07-30 DIAGNOSIS — I83.893 VARICOSE VEINS OF BILATERAL LOWER EXTREMITIES WITH OTHER COMPLICATIONS: ICD-10-CM

## 2025-07-30 PROBLEM — D04.9 BASAL CELL CARCINOMA (BCC) IN SITU OF SKIN: Status: ACTIVE | Noted: 2025-07-30

## 2025-07-30 PROBLEM — M19.90 OSTEOARTHROSIS: Status: ACTIVE | Noted: 2023-12-05

## 2025-07-30 PROBLEM — G89.29 CHRONIC SI JOINT PAIN: Status: ACTIVE | Noted: 2025-07-30

## 2025-07-30 PROBLEM — Z85.828 HISTORY OF BASAL CELL CARCINOMA: Status: ACTIVE | Noted: 2025-07-30

## 2025-07-30 PROBLEM — L02.424: Status: ACTIVE | Noted: 2021-06-28

## 2025-07-30 PROBLEM — M53.3 CHRONIC SI JOINT PAIN: Status: ACTIVE | Noted: 2025-07-30

## 2025-07-30 PROCEDURE — 3074F SYST BP LT 130 MM HG: CPT | Performed by: INTERNAL MEDICINE

## 2025-07-30 PROCEDURE — G2211 COMPLEX E/M VISIT ADD ON: HCPCS | Performed by: INTERNAL MEDICINE

## 2025-07-30 PROCEDURE — G0463 HOSPITAL OUTPT CLINIC VISIT: HCPCS | Performed by: INTERNAL MEDICINE

## 2025-07-30 PROCEDURE — 3078F DIAST BP <80 MM HG: CPT | Performed by: INTERNAL MEDICINE

## 2025-07-30 PROCEDURE — 99205 OFFICE O/P NEW HI 60 MIN: CPT | Performed by: INTERNAL MEDICINE

## 2025-07-30 RX ORDER — LEVOTHYROXINE SODIUM 25 UG/1
25 TABLET ORAL
COMMUNITY

## 2025-07-30 NOTE — PATIENT INSTRUCTIONS
Please go for venous comp studies, our staff will call and  schedule test     Use edema wear yellow stripe day time thigh to ankle area , DME Rx given     See edema therapist , order placed , they will call you     Follow up with me in 2-3 months

## 2025-07-30 NOTE — PROGRESS NOTES
[facility]        Patient is here for a consult to discuss leg swelling    Pt is currently taking no meds that would impact our treatment plan.    /73 (BP Location: Right arm, Patient Position: Sitting, Cuff Size: Adult Regular)   Pulse 77   Wt 163 lb (73.9 kg)   SpO2 97%   BMI 27.98 kg/m      The provider has been notified that the patient has no concerns.     Questions patient would like addressed today are: N/A.    Refills are needed: N/A    Has homecare services and agency name:  [facility]

## 2025-07-30 NOTE — PROGRESS NOTES
[facility] INITIAL VASCULAR MEDICINE CONSULT    ( New patient Visit)     PRIMARY HEALTH CARE PROVIDER:  Crys Montes MD      REFERRING HEALTH CARE PROVIDER;  Referred Self      REASON FOR CONSULT: Evaluation and management of vascular causes of bilateral lower extremity swelling for more than 2 years      HPI: Ginger Mcgill is a 79 year old very pleasant female with history of hypertension, hypothyroidism on replacement and also impaired fasting glucose dealing with bilateral lower extremity swelling for more than 2 years and worse end of the day and when she gets up from the bed in the morning leg swelling is better.  No pain, no discomfort and she underwent knee replacement on the left side in the past and previous venous duplex ultrasound is negative.  She is not on any dihydropyridine calcium channel blockers.  She denies any chest pain, shortness of breath or palpitations.  She has a history of bilateral lower extremity varicose veins no previous intervention.  She is new to me reviewed available records in the epic and updated chart    PAST MEDICAL HISTORY  Past Medical History:   Diagnosis Date    Arthritis     Osteoarthritis    Hashimoto's thyroiditis     Hyperlipemia     Hypertension     Hypothyroid        CURRENT MEDICATIONS  Current Outpatient Medications   Medication Sig Dispense Refill    CHLORTHALIDONE PO Take 12.5 mg by mouth daily (Takes 0.5 x 25mg tablet = 12.5mg dose)      Cholecalciferol (VITAMIN D PO) Take 2,000 Units by mouth every morning Liquid Formulation (takes 2 drops x 1000unit/drop = 2000 units)       levothyroxine (SYNTHROID/LEVOTHROID) 25 MCG tablet Take 25 mcg by mouth every morning (before breakfast).      Omega-3 Fatty Acids (FISH OIL PO) Take 3 capsules by mouth every morning       potassium chloride ER (K-TAB/KLOR-CON) 10 MEQ CR tablet       thyroid (ARMOUR) 90 MG tablet 90 mg Sat, Sunday, Tues Thursday.  60 mg Mon, Wed, Friday (Patient  taking differently: Take 60 mg by mouth daily.)      UBIQUINOL PO Take 100 mg by mouth every morning        No current facility-administered medications for this visit.       PAST SURGICAL HISTORY:  Past Surgical History:   Procedure Laterality Date    ARTHROPLASTY KNEE Left 4/3/2017    Procedure: ARTHROPLASTY KNEE;  Surgeon: Noah Nolan MD;  Location:  OR    LAPAROSCOPIC APPENDECTOMY N/A 5/16/2015    Procedure: LAPAROSCOPIC APPENDECTOMY;  Surgeon: Malik Parnell MD;  Location:  OR    THYROID SURGERY         ALLERGIES     Allergies   Allergen Reactions    Erythromycin Hives    Sulfa Antibiotics Hives     Other Reaction(s): Erythema       FAMILY HISTORY  Family History   Problem Relation Age of Onset    Diabetes Mother     Heart Disease Mother     Hypertension Mother     Heart Disease Father     Heart Disease Brother     Diabetes Sister              SOCIAL HISTORY  Social History     Socioeconomic History    Marital status: Single     Spouse name: Not on file    Number of children: Not on file    Years of education: Not on file    Highest education level: Not on file   Occupational History    Not on file   Tobacco Use    Smoking status: Never    Smokeless tobacco: Never   Substance and Sexual Activity    Alcohol use: Yes     Alcohol/week: 2.5 standard drinks of alcohol     Types: 3 Standard drinks or equivalent per week     Comment: rarely    Drug use: No    Sexual activity: Not on file   Other Topics Concern    Not on file   Social History Narrative    Not on file     Social Drivers of Health     Financial Resource Strain: Low Risk  (12/6/2024)    Received from [facility]    Financial Resource Strain     Difficulty of Paying Living Expenses: 3     Difficulty of Paying Living Expenses: Not on file   Food Insecurity: No Food Insecurity (12/6/2024)    [facility]    Food Insecurity     Do you worry your food will  run out before you are able to buy more?: 1   Transportation Needs: No Transportation Needs (12/6/2024)    [facility]    Transportation Needs     Does lack of transportation keep you from medical appointments?: 1     Does lack of transportation keep you from work, meetings or getting things that you need?: 1   Physical Activity: Not on file   Stress: Not on file   Social Connections: Socially Integrated (12/6/2024)    Received from [facility]    Social Connections     Do you often feel lonely or isolated from those around you?: 0   Interpersonal Safety: Not on file   Housing Stability: Low Risk  (12/6/2024)    [facility]    Housing Stability     What is your housing situation today?: 1       ROS:   General: No change in weight, sleep or appetite.  Normal energy.  No fever or chills  Eyes: Negative for vision changes or eye problems  ENT: No problems with ears, nose or throat.  No difficulty swallowing.  Resp: No coughing, wheezing or shortness of breath  CV: No chest pains or palpitations  GI: No nausea, vomiting,  heartburn, abdominal pain, diarrhea, constipation or change in bowel habits  : No urinary frequency or dysuria, bladder or kidney problems  Musculoskeletal: No significant muscle or joint pains  Neurologic: No headaches, numbness, tingling, weakness, problems with balance or coordination  Psychiatric: No problems with anxiety, depression or mental health  Heme/immune/allergy: No history of bleeding or clotting problems or anemia.  No allergies or immune system problems  Endocrine: No history of thyroid disease, diabetes or other endocrine disorders  Skin: No rashes,worrisome lesions or skin problems  Vascular:  No claudication, lifestyle limiting or otherwise; no ischemic rest pain; no non-healing ulcers. No weakness, No loss of sensation    Bilateral lower extremity swelling  for more than 2 years worse end of the day and gets better in the morning when she wakes up from the bed  History of bilateral lower extremity varicose veins no previous intervention and no history of DVT    EXAM:  /77 (BP Location: Left arm, Patient Position: Sitting, Cuff Size: Adult Regular)   Pulse 77   Wt 163 lb (73.9 kg)   SpO2 97%   BMI 27.98 kg/m    In general, the patient is a pleasant female in no apparent distress.    HEENT: NC/AT.  PERRLA.  EOMI.  Sclerae white, not injected.  Nares clear.  Pharynx without erythema or exudate.  Dentition intact.    Neck: No adenopathy.  No thyromegaly. Carotids +2/2 bilaterally without bruits.  No jugular venous distension.   Heart: RRR. Normal S1, S2 splits physiologically. No murmur, rub, click, or gallop. T  Lungs: CTA.  No ronchi, wheezes, rales.  No dullness to percussion.   Abdomen: Soft, nontender, nondistended. No organomegaly. No AAA.  No bruits.   Extremities: Vascular:  Bilateral lower extremity varicose veins with CEAP 4 CVI  Secondary lymphedema or Phlebo-lymphedema features with loss of contour of the legs dorsal hump squaring of the toes noted  No foot ulcers or leg ulcers  Good palpable peripheral pulses in both lower extremities        Labs:    LIVER ENZYME RESULTS:  Lab Results   Component Value Date    AST 29 05/16/2015    ALT 38 05/16/2015       CBC RESULTS:  Lab Results   Component Value Date    WBC 8.7 03/09/2020    RBC 4.54 03/09/2020    HGB 13.7 03/09/2020    HCT 40.4 03/09/2020    MCV 89 03/09/2020    MCH 30.2 03/09/2020    MCHC 33.9 03/09/2020    RDW 12.6 03/09/2020     03/09/2020       BMP RESULTS:  Lab Results   Component Value Date     04/04/2017    POTASSIUM 4.1 04/04/2017    CHLORIDE 101 04/04/2017    CO2 28 04/04/2017    ANIONGAP 8 04/04/2017     (H) 04/05/2017    BUN 14 04/04/2017    CR 0.67 04/06/2017    GFRESTIMATED 86 04/06/2017    GFRESTBLACK >90   GFR Calc   04/06/2017    YUMI 8.1 (L)  04/04/2017            Procedures:       Assessment and Plan:     1. Lymphedema ( phlebo-lymphedema) (Primary)  - Compression Sleeve/Stocking Order for DME - ONLY FOR DME  - Lymphedema Therapy  Referral; Future    2. Varicose veins of bilateral lower extremities with other complications  - US Venous Competency Bilateral; Future    3. Benign essential hypertension    4. Hypothyroidism due to Hashimoto thyroiditis    5. IFG (impaired fasting glucose)     This is a very pleasant 79-year-old female with history of hypertension well-controlled and hypothyroidism on replacement clinically euthyroid and known history of bilateral lower extremity varicose veins no previous intervention no history of DVT or PE dealing with bilateral lower extremity swelling worse end of the day and no leg pain.  She denies any chest pain or shortness of breath.  Her exam is consistent with bilateral lower extremity varicose veins with venous insufficiency and secondary lymphedema or Phlebo-lymphedema.  She is not using any compression stockings  I had a lengthy discussion with the patient  Suggested utilizing edema wear yellow stripe thigh-high DME prescription given and use it during the daytime and elevate the legs when able    Arrange referral to see lymphedema therapist for compression, MLD, wraps etc.  Will get bilateral lower extremity venous competency studies order placed  Consider getting vein formula 1000 information and flyer given    Follow-up with me in 2 to 3 months    60 minutes spent on the date of the encounter doing chart review, history and exam, documentation, and further activities as noted above.    The longitudinal care of plan for the above diagnoses was addressed during this visit. Due to added complexity of care, we will continue to supprt Ginger Mcgill and the subsequent management of this/these conditions and with ongoing continuity of care for this/these conditions.     Copy of this note to primary  care physician      Evette Angelo MD,MARJORIE,FSVM,FNLA, FACP  Vascular Medicine  Clinical Hypertension Specialist   Clinical Lipidologist

## 2025-07-31 ENCOUNTER — TELEPHONE (OUTPATIENT)
Dept: OTHER | Facility: CLINIC | Age: 80
End: 2025-07-31
Payer: COMMERCIAL

## 2025-07-31 NOTE — TELEPHONE ENCOUNTER
Routing to scheduling to coordinate the following:    BLE venous competency US  We will call with results  Please schedule this next available     Maira CARBONE RN    [facility]  Office: [phone]  Fax: [phone]

## 2025-08-11 ENCOUNTER — ANCILLARY PROCEDURE (OUTPATIENT)
Dept: ULTRASOUND IMAGING | Facility: CLINIC | Age: 80
End: 2025-08-11
Attending: INTERNAL MEDICINE
Payer: COMMERCIAL

## 2025-08-11 DIAGNOSIS — I83.893 VARICOSE VEINS OF BILATERAL LOWER EXTREMITIES WITH OTHER COMPLICATIONS: ICD-10-CM

## 2025-08-11 PROCEDURE — 93970 EXTREMITY STUDY: CPT | Performed by: SURGERY

## (undated) DEVICE — GLOVE PROTEXIS POWDER FREE 8.0 ORTHOPEDIC 2D73ET80

## (undated) DEVICE — PREP CHLORAPREP 26ML TINTED ORANGE  260815

## (undated) DEVICE — LINEN TOWEL PACK X5 5464

## (undated) DEVICE — CAST PADDING 6" UNSTERILE 9046

## (undated) DEVICE — BLADE SAW SAGITTAL STRK 21X90X1.27MM HD SYS 6 6221-127-090

## (undated) DEVICE — ESU GROUND PAD UNIVERSAL W/O CORD

## (undated) DEVICE — GLOVE PROTEXIS POWDER FREE 8.5 ORTHOPEDIC 2D73ET85

## (undated) DEVICE — WRAP EZY KNEE

## (undated) DEVICE — SUCTION IRR SYSTEM W/O TIP INTERPULSE HANDPIECE 0210-100-000

## (undated) DEVICE — SU ETHIBOND 0 CTX CR  8X18" CX31D

## (undated) DEVICE — SU WND CLOSURE VLOC 180 ABS 0 24" GS-25 VLOCL0436

## (undated) DEVICE — GLOVE PROTEXIS W/NEU-THERA 8.0  2D73TE80

## (undated) DEVICE — CAST PADDING 4" UNSTERILE 9044

## (undated) DEVICE — GLOVE PROTEXIS W/NEU-THERA 8.5  2D73TE85

## (undated) DEVICE — DRAPE POUCH INSTRUMENT 1018

## (undated) DEVICE — SU VICRYL 2-0 CP-1 27" UND J266H

## (undated) DEVICE — BLADE SAW SAGITTAL STRK 29X83.5X1.27MM 4/2000 2108-183-000

## (undated) DEVICE — PACK TOTAL KNEE SOP15TKFSD

## (undated) DEVICE — MANIFOLD NEPTUNE 4 PORT 700-20

## (undated) DEVICE — GOWN IMPERVIOUS SPECIALTY XL/XLONG 39049

## (undated) DEVICE — DRAIN ROUND W/RESERV KIT JACKSON PRATT 10FR 400ML SU130-402D

## (undated) DEVICE — DRSG XEROFORM 5X9" 8884431605

## (undated) DEVICE — SYR 30ML LL W/O NDL

## (undated) DEVICE — IMM KNEE 20" 0814-2660

## (undated) DEVICE — TOURNIQUET CUFF 34" STERILE

## (undated) DEVICE — NDL SPINAL 18GA 3.5" 405184

## (undated) DEVICE — BONE CLEANING TIP INTERPULSE  0210-010-000

## (undated) DEVICE — DRSG ABDOMINAL 07 1/2X8" 7197D

## (undated) DEVICE — HOOD FLYTE W/PEELAWAY 408-800-100

## (undated) DEVICE — DRSG GAUZE 4X4" 3033

## (undated) DEVICE — BONE CEMENT MIXEVAC III HI VAC KIT  0206-015-000

## (undated) RX ORDER — KETOROLAC TROMETHAMINE 30 MG/ML
INJECTION, SOLUTION INTRAMUSCULAR; INTRAVENOUS
Status: DISPENSED
Start: 2017-04-03

## (undated) RX ORDER — CEFAZOLIN SODIUM 2 G/100ML
INJECTION, SOLUTION INTRAVENOUS
Status: DISPENSED
Start: 2017-04-03

## (undated) RX ORDER — FENTANYL CITRATE 50 UG/ML
INJECTION, SOLUTION INTRAMUSCULAR; INTRAVENOUS
Status: DISPENSED
Start: 2017-04-03

## (undated) RX ORDER — ACYCLOVIR 200 MG/1
CAPSULE ORAL
Status: DISPENSED
Start: 2017-04-03

## (undated) RX ORDER — HYDROMORPHONE HYDROCHLORIDE 1 MG/ML
INJECTION, SOLUTION INTRAMUSCULAR; INTRAVENOUS; SUBCUTANEOUS
Status: DISPENSED
Start: 2017-04-03

## (undated) RX ORDER — ROPIVACAINE HYDROCHLORIDE 2 MG/ML
INJECTION, SOLUTION EPIDURAL; INFILTRATION; PERINEURAL
Status: DISPENSED
Start: 2017-04-03

## (undated) RX ORDER — DEXAMETHASONE SODIUM PHOSPHATE 4 MG/ML
INJECTION, SOLUTION INTRA-ARTICULAR; INTRALESIONAL; INTRAMUSCULAR; INTRAVENOUS; SOFT TISSUE
Status: DISPENSED
Start: 2017-04-03